# Patient Record
Sex: MALE | Race: WHITE
[De-identification: names, ages, dates, MRNs, and addresses within clinical notes are randomized per-mention and may not be internally consistent; named-entity substitution may affect disease eponyms.]

---

## 2017-06-12 ENCOUNTER — HOSPITAL ENCOUNTER (INPATIENT)
Dept: HOSPITAL 61 - ER | Age: 47
LOS: 1 days | Discharge: HOME | DRG: 444 | End: 2017-06-13
Attending: INTERNAL MEDICINE | Admitting: INTERNAL MEDICINE
Payer: COMMERCIAL

## 2017-06-12 VITALS — SYSTOLIC BLOOD PRESSURE: 155 MMHG | DIASTOLIC BLOOD PRESSURE: 86 MMHG

## 2017-06-12 VITALS — WEIGHT: 274.56 LBS | BODY MASS INDEX: 40.67 KG/M2 | HEIGHT: 69 IN

## 2017-06-12 VITALS — DIASTOLIC BLOOD PRESSURE: 75 MMHG | SYSTOLIC BLOOD PRESSURE: 108 MMHG

## 2017-06-12 VITALS — DIASTOLIC BLOOD PRESSURE: 81 MMHG | SYSTOLIC BLOOD PRESSURE: 113 MMHG

## 2017-06-12 VITALS — SYSTOLIC BLOOD PRESSURE: 120 MMHG | DIASTOLIC BLOOD PRESSURE: 84 MMHG

## 2017-06-12 VITALS — SYSTOLIC BLOOD PRESSURE: 125 MMHG | DIASTOLIC BLOOD PRESSURE: 78 MMHG

## 2017-06-12 DIAGNOSIS — I51.7: ICD-10-CM

## 2017-06-12 DIAGNOSIS — K76.0: ICD-10-CM

## 2017-06-12 DIAGNOSIS — K80.20: Primary | ICD-10-CM

## 2017-06-12 DIAGNOSIS — K29.70: ICD-10-CM

## 2017-06-12 DIAGNOSIS — N17.0: ICD-10-CM

## 2017-06-12 DIAGNOSIS — E78.5: ICD-10-CM

## 2017-06-12 DIAGNOSIS — N18.9: ICD-10-CM

## 2017-06-12 DIAGNOSIS — Z82.49: ICD-10-CM

## 2017-06-12 DIAGNOSIS — K21.0: ICD-10-CM

## 2017-06-12 DIAGNOSIS — I12.9: ICD-10-CM

## 2017-06-12 LAB
ALBUMIN SERPL-MCNC: 4 G/DL (ref 3.4–5)
ALBUMIN/GLOB SERPL: 0.9 {RATIO} (ref 1–1.7)
ALP SERPL-CCNC: 95 U/L (ref 46–116)
ALT SERPL-CCNC: 38 U/L (ref 16–63)
ANION GAP SERPL CALC-SCNC: 11 MMOL/L (ref 6–14)
AST SERPL-CCNC: 17 U/L (ref 15–37)
BASOPHILS # BLD AUTO: 0 X10^3/UL (ref 0–0.2)
BASOPHILS NFR BLD: 0 % (ref 0–3)
BILIRUB SERPL-MCNC: 0.5 MG/DL (ref 0.2–1)
BUN SERPL-MCNC: 14 MG/DL (ref 8–26)
BUN/CREAT SERPL: 10 (ref 6–20)
CALCIUM SERPL-MCNC: 8.7 MG/DL (ref 8.5–10.1)
CHLORIDE SERPL-SCNC: 104 MMOL/L (ref 98–107)
CHOLEST SERPL-MCNC: 223 MG/DL (ref 0–200)
CHOLEST/HDLC SERPL: 6.8 {RATIO}
CO2 SERPL-SCNC: 25 MMOL/L (ref 21–32)
CREAT SERPL-MCNC: 1.4 MG/DL (ref 0.7–1.3)
EOSINOPHIL NFR BLD: 3 % (ref 0–3)
ERYTHROCYTE [DISTWIDTH] IN BLOOD BY AUTOMATED COUNT: 13 % (ref 11.5–14.5)
GFR SERPLBLD BASED ON 1.73 SQ M-ARVRAT: 54.6 ML/MIN
GLOBULIN SER-MCNC: 4.3 G/DL (ref 2.2–3.8)
GLUCOSE SERPL-MCNC: 154 MG/DL (ref 70–99)
HCT VFR BLD CALC: 47.9 % (ref 39–53)
HDLC SERPL-MCNC: 33 MG/DL (ref 40–60)
HGB BLD-MCNC: 16 G/DL (ref 13–17.5)
LYMPHOCYTES # BLD: 3.5 X10^3/UL (ref 1–4.8)
LYMPHOCYTES NFR BLD AUTO: 31 % (ref 24–48)
MCH RBC QN AUTO: 30 PG (ref 25–35)
MCHC RBC AUTO-ENTMCNC: 34 G/DL (ref 31–37)
MCV RBC AUTO: 89 FL (ref 79–100)
MONOCYTES NFR BLD: 10 % (ref 0–9)
NEUTROPHILS NFR BLD AUTO: 57 % (ref 31–73)
NONHDLC SERPL-MCNC: 190 MG/DL (ref 0–129)
PLATELET # BLD AUTO: 260 X10^3/UL (ref 140–400)
POTASSIUM SERPL-SCNC: 3.5 MMOL/L (ref 3.5–5.1)
PROT SERPL-MCNC: 8.3 G/DL (ref 6.4–8.2)
RBC # BLD AUTO: 5.36 X10^6/UL (ref 4.3–5.7)
SODIUM SERPL-SCNC: 140 MMOL/L (ref 136–145)
TRIGL SERPL-MCNC: 117 MG/DL (ref 0–150)
WBC # BLD AUTO: 11.6 X10^3/UL (ref 4–11)

## 2017-06-12 PROCEDURE — 96375 TX/PRO/DX INJ NEW DRUG ADDON: CPT

## 2017-06-12 PROCEDURE — 76700 US EXAM ABDOM COMPLETE: CPT

## 2017-06-12 PROCEDURE — 71275 CT ANGIOGRAPHY CHEST: CPT

## 2017-06-12 PROCEDURE — 93017 CV STRESS TEST TRACING ONLY: CPT

## 2017-06-12 PROCEDURE — 80061 LIPID PANEL: CPT

## 2017-06-12 PROCEDURE — 85027 COMPLETE CBC AUTOMATED: CPT

## 2017-06-12 PROCEDURE — 80053 COMPREHEN METABOLIC PANEL: CPT

## 2017-06-12 PROCEDURE — 80048 BASIC METABOLIC PNL TOTAL CA: CPT

## 2017-06-12 PROCEDURE — 94250: CPT

## 2017-06-12 PROCEDURE — 36415 COLL VENOUS BLD VENIPUNCTURE: CPT

## 2017-06-12 PROCEDURE — 84484 ASSAY OF TROPONIN QUANT: CPT

## 2017-06-12 PROCEDURE — A9500 TC99M SESTAMIBI: HCPCS

## 2017-06-12 PROCEDURE — 78452 HT MUSCLE IMAGE SPECT MULT: CPT

## 2017-06-12 PROCEDURE — 71010: CPT

## 2017-06-12 PROCEDURE — 96374 THER/PROPH/DIAG INJ IV PUSH: CPT

## 2017-06-12 PROCEDURE — 93005 ELECTROCARDIOGRAM TRACING: CPT

## 2017-06-12 PROCEDURE — 94760 N-INVAS EAR/PLS OXIMETRY 1: CPT

## 2017-06-12 PROCEDURE — 85379 FIBRIN DEGRADATION QUANT: CPT

## 2017-06-12 RX ADMIN — PANTOPRAZOLE SODIUM SCH MG: 40 TABLET, DELAYED RELEASE ORAL at 15:12

## 2017-06-12 RX ADMIN — BACITRACIN SCH MLS/HR: 5000 INJECTION, POWDER, FOR SOLUTION INTRAMUSCULAR at 20:29

## 2017-06-12 RX ADMIN — BACITRACIN SCH MLS/HR: 5000 INJECTION, POWDER, FOR SOLUTION INTRAMUSCULAR at 04:41

## 2017-06-12 RX ADMIN — BACITRACIN SCH MLS/HR: 5000 INJECTION, POWDER, FOR SOLUTION INTRAMUSCULAR at 06:32

## 2017-06-12 RX ADMIN — NITROGLYCERIN PRN MG: 0.4 TABLET SUBLINGUAL at 05:38

## 2017-06-12 RX ADMIN — NITROGLYCERIN PRN MG: 0.4 TABLET SUBLINGUAL at 06:24

## 2017-06-12 NOTE — PDOC2
GI CONSULT


Reason For Consult:


Severe gastritis/GERD





HPI:


HPI:


47 y/o male admitted w/ stabbing chest pain that began after an episode of 

reflux during the night.  Associated w/ sweating, radiation to back between 

shoulder blades.  GI cocktail ineffective, relieved w/ nitro.  Labs unrevealing 

except for WBC 11.6, Cr 1.4, dyslipidemia.  Evaluated by cardiology, MPI 

negative.  H/o nocturnal reflux, severe, occurs about once monthly.  He wakes 

up "choking."  Not previously associated w/ chest pain.  No use of antacids, H2 

blockers, or PPI.  No previous EGD.  Does take Excedrin PRN.  No dysphagia, n/v

, abd pain, diarrhea, constipation, hematochezia, melena.  No previous 

colonoscopy.  Pain better, now an "ache."





PMH:


PMH:


GERD, HTN, HLD, benign neck tumor removal





FH:


Family History:  No pertinent hx (denies GI cancers)





Social History:


Smoke:  No


ALCOHOL:  none


Drugs:  None





ROS:





GEN: +sweats


HEENT: Denies blurred vision, sore throat


CV: +chest pain


RESP: Denies shortness of air, cough


GI: Per HPI


: Denies hematuria, dysuria


ENDO: Denies weight changes


NEURO: Denies confusion, dizziness


MSK: Denies weakness, joint pain/swelling


SKIN: Denies jaundice, pruritus





Vitals:


Vitals:





 Vital Signs








  Date Time  Temp Pulse Resp B/P (MAP) Pulse Ox O2 Delivery O2 Flow Rate FiO2


 


6/12/17 15:00 98.4 66 18 120/84 (96) 98 Room Air  





 98.4       











Labs:


Labs:





Laboratory Tests








Test


  6/12/17


03:25 6/12/17


10:05


 


White Blood Count


  11.6 x10^3/uL


(4.0-11.0) 


 


 


Red Blood Count


  5.36 x10^6/uL


(4.30-5.70) 


 


 


Hemoglobin


  16.0 g/dL


(13.0-17.5) 


 


 


Hematocrit


  47.9 %


(39.0-53.0) 


 


 


Mean Corpuscular Volume 89 fL ()  


 


Mean Corpuscular Hemoglobin 30 pg (25-35)  


 


Mean Corpuscular Hemoglobin


Concent 34 g/dL


(31-37) 


 


 


Red Cell Distribution Width


  13.0 %


(11.5-14.5) 


 


 


Platelet Count


  260 x10^3/uL


(140-400) 


 


 


Neutrophils (%) (Auto) 57 % (31-73)  


 


Lymphocytes (%) (Auto) 31 % (24-48)  


 


Monocytes (%) (Auto) 10 % (0-9)  


 


Eosinophils (%) (Auto) 3 % (0-3)  


 


Basophils (%) (Auto) 0 % (0-3)  


 


Neutrophils # (Auto)


  6.6 x10^3uL


(1.8-7.7) 


 


 


Lymphocytes # (Auto)


  3.5 x10^3/uL


(1.0-4.8) 


 


 


Monocytes # (Auto)


  1.1 x10^3/uL


(0.0-1.1) 


 


 


Eosinophils # (Auto)


  0.3 x10^3/uL


(0.0-0.7) 


 


 


Basophils # (Auto)


  0.0 x10^3/uL


(0.0-0.2) 


 


 


D-Dimer (Concepcion)


  < 0.27


ug/mlFEU 


 


 


Sodium Level


  140 mmol/L


(136-145) 


 


 


Potassium Level


  3.5 mmol/L


(3.5-5.1) 


 


 


Chloride Level


  104 mmol/L


() 


 


 


Carbon Dioxide Level


  25 mmol/L


(21-32) 


 


 


Anion Gap 11 (6-14)  


 


Blood Urea Nitrogen


  14 mg/dL


(8-26) 


 


 


Creatinine


  1.4 mg/dL


(0.7-1.3) 


 


 


Estimated GFR


(Cockcroft-Gault) 54.6 


  


 


 


BUN/Creatinine Ratio 10 (6-20)  


 


Glucose Level


  154 mg/dL


(70-99) 


 


 


Calcium Level


  8.7 mg/dL


(8.5-10.1) 


 


 


Total Bilirubin


  0.5 mg/dL


(0.2-1.0) 


 


 


Aspartate Amino Transf


(AST/SGOT) 17 U/L (15-37) 


  


 


 


Alanine Aminotransferase


(ALT/SGPT) 38 U/L (16-63) 


  


 


 


Alkaline Phosphatase


  95 U/L


() 


 


 


Troponin I Quantitative


  < 0.017 ng/mL


(0.000-0.055) < 0.017 ng/mL


(0.000-0.055)


 


Total Protein


  8.3 g/dL


(6.4-8.2) 


 


 


Albumin


  4.0 g/dL


(3.4-5.0) 


 


 


Albumin/Globulin Ratio 0.9 (1.0-1.7)  


 


Triglycerides Level


  117 mg/dL


(0-150) 


 


 


Cholesterol Level


  223 mg/dL


(0-200) 


 


 


LDL Cholesterol, Calculated


  167 mg/dL


(0-100) 


 


 


VLDL Cholesterol, Calculated


  23 mg/dL


(0-40) 


 


 


Non-HDL Cholesterol Calculated


  190 mg/dL


(0-129) 


 


 


HDL Cholesterol


  33 mg/dL


(40-60) 


 


 


Cholesterol/HDL Ratio 6.8  











Allergies:


Coded Allergies:  


     No Known Drug Allergies (Unverified , 6/12/17)





Medications:





Current Medications








 Medications


  (Trade)  Dose


 Ordered  Sig/Jorge


 Route


 PRN Reason  Start Time


 Stop Time Status Last Admin


Dose Admin


 


 Multi-Ingredient


 Mouthwash/Gargle


  (Gi Cocktail


 Single Dose)  15 ml  1X  ONCE


 SWSW


   6/12/17 04:30


 6/12/17 04:31 DC 6/12/17 04:40


 


 


 Morphine Sulfate  4 mg  1X  ONCE


 IV


   6/12/17 04:30


 6/12/17 04:31 DC 6/12/17 04:40


 


 


 Ondansetron HCl


  (Zofran)  4 mg  1X  ONCE


 IV


   6/12/17 04:30


 6/12/17 04:31 DC 6/12/17 04:40


 


 


 Sodium Chloride  1,000 ml @ 


 125 mls/hr  Q8H


 IV


   6/12/17 04:29


 6/13/17 04:28  6/12/17 06:32


 


 


 Nitroglycerin


  (Nitrostat)  0.4 mg  PRN Q5MIN  PRN


 SL


 CHEST PAIN  6/12/17 04:30


 6/13/17 04:29  6/12/17 06:24


 


 


 Iohexol


  (Omnipaque 300


 Mg/ml)  60 ml  1X  ONCE


 IV


   6/12/17 05:30


 6/12/17 05:31 DC 6/12/17 05:14


 


 


 Aspirin


  (Shital Aspirin)  325 mg  1X  ONCE


 PO


   6/12/17 09:45


 6/12/17 09:46 DC 6/12/17 10:08


 


 


 Regadenoson


  (Lexiscan)  0.4 mg  1X  ONCE


 IV


   6/12/17 11:45


 6/12/17 11:46 DC 6/12/17 12:15


 


 


 Pantoprazole


 Sodium


  (Protonix)  40 mg  DAILYAC


 PO


   6/12/17 15:00


    6/12/17 15:12


 











Imaging:


Imaging:


CXR 6/12/17 x2


IMPRESSION: 


1. No confluent infiltrates.





Chest CTA 6/12/17


IMPRESSION:


1. No evidence of pulmonary embolism.


2. The lungs are clear.


3. Mild cardiomegaly.








MPI 6/12/17


Conclusion


1. Regadenoson cardioisotope stress test did not show any evidence of ischemia 

or infarct.


2. Normal left ventricular systolic function with ejection fraction calculated 

at 75%.


3. Low risk for cardiac events.





PE:





GEN: NAD, laying in bed


HEENT: Atraumatic, PERRL


LUNGS: CTAB


HEART: RRR


ABD: NABS, S/ND/NT, overweight


EXTREMITY: No edema


SKIN: No rashes, no jaundice


NEURO/PSYCH: A & O 3


OTHER: wife present





A/P:


A/P:


Chest pain w/ radiation to upper back


-onset during the night w/ reflux





Reflux


-history of this, nocturnal symptoms, wakes up "choking" once monthly


-untreated, no previous eval


-started on PPI today





NSAID use


-Excedrin





CRC screen


-no previous colonoscopy, average risk





--


Will review w/ Dr. Montiel - ?need for inpatient EGD.


Agree w/ PPI.


Screening colonoscopy age 50.











PRAKASH OCHOA Jun 12, 2017 15:46

## 2017-06-12 NOTE — PHYS DOC
Past Medical History


Past Medical History:  No Pertinent History


Past Surgical History:  Other


Additional Past Surgical Histo:  RIGHT NECK TUMOR REMOVED


Alcohol Use:  Occasionally


Drug Use:  None





Adult General


Chief Complaint


Chief Complaint:  CHEST PAIN





HPI


HPI





Patient is a 46  year old [f__sex] who presents with []





Review of Systems


Review of Systems





Constitutional: Denies fever or chills []


Eyes: Denies change in visual acuity, redness, or eye pain []


HENT: Denies nasal congestion or sore throat []


Respiratory: Denies cough or shortness of breath []


Cardiovascular: No additional information not addressed in HPI []


GI: Denies abdominal pain, nausea, vomiting, bloody stools or diarrhea []


: Denies dysuria or hematuria []


Musculoskeletal: Denies back pain or joint pain []


Integument: Denies rash or skin lesions []


Neurologic: Denies headache, focal weakness or sensory changes []


Endocrine: Denies polyuria or polydipsia []





Allergies


Allergies





Allergies








Coded Allergies Type Severity Reaction Last Updated Verified


 


  No Known Drug Allergies    6/12/17 No











Physical Exam


Physical Exam





Constitutional: Well developed, well nourished, no acute distress, non-toxic 

appearance. []


HENT: Normocephalic, atraumatic, bilateral external ears normal, oropharynx 

moist, no oral exudates, nose normal. []


Eyes: PERRLA, EOMI, conjunctiva normal, no discharge. [] 


Neck: Normal range of motion, no tenderness, supple, no stridor. [] 


Cardiovascular:Heart rate regular rhythm, no murmur []


Lungs & Thorax:  Bilateral breath sounds clear to auscultation []


Abdomen: Bowel sounds normal, soft, no tenderness, no masses, no pulsatile 

masses. [] 


Skin: Warm, dry, no erythema, no rash. [] 


Back: No tenderness, no CVA tenderness. [] 


Extremities: No tenderness, no cyanosis, no clubbing, ROM intact, no edema. [] 


Neurologic: Alert and oriented X 3, normal motor function, normal sensory 

function, no focal deficits noted. []


Psychologic: Affect normal, judgement normal, mood normal. []





Current Patient Data


Vital Signs





 Vital Signs








  Date Time  Temp Pulse Resp B/P (MAP) Pulse Ox O2 Delivery O2 Flow Rate FiO2


 


6/12/17 03:20 97.6 96 20 159/86 (110) 100 Room Air  





 97.6       








Lab Values





 Laboratory Tests








Test


  6/12/17


03:25


 


White Blood Count


  11.6 x10^3/uL


(4.0-11.0)  H


 


Red Blood Count


  5.36 x10^6/uL


(4.30-5.70)


 


Hemoglobin


  16.0 g/dL


(13.0-17.5)


 


Hematocrit


  47.9 %


(39.0-53.0)


 


Mean Corpuscular Volume


  89 fL ()


 


 


Mean Corpuscular Hemoglobin 30 pg (25-35)  


 


Mean Corpuscular Hemoglobin


Concent 34 g/dL


(31-37)


 


Red Cell Distribution Width


  13.0 %


(11.5-14.5)


 


Platelet Count


  260 x10^3/uL


(140-400)


 


Neutrophils (%) (Auto) 57 % (31-73)  


 


Lymphocytes (%) (Auto) 31 % (24-48)  


 


Monocytes (%) (Auto) 10 % (0-9)  H


 


Eosinophils (%) (Auto) 3 % (0-3)  


 


Basophils (%) (Auto) 0 % (0-3)  


 


Neutrophils # (Auto)


  6.6 x10^3uL


(1.8-7.7)


 


Lymphocytes # (Auto)


  3.5 x10^3/uL


(1.0-4.8)


 


Monocytes # (Auto)


  1.1 x10^3/uL


(0.0-1.1)


 


Eosinophils # (Auto)


  0.3 x10^3/uL


(0.0-0.7)


 


Basophils # (Auto)


  0.0 x10^3/uL


(0.0-0.2)


 


D-Dimer (Concepcion)


  < 0.27


ug/mlFEU


 


Sodium Level


  140 mmol/L


(136-145)


 


Potassium Level


  3.5 mmol/L


(3.5-5.1)


 


Chloride Level


  104 mmol/L


()


 


Carbon Dioxide Level


  25 mmol/L


(21-32)


 


Anion Gap 11 (6-14)  


 


Blood Urea Nitrogen


  14 mg/dL


(8-26)


 


Creatinine


  1.4 mg/dL


(0.7-1.3)  H


 


Estimated GFR


(Cockcroft-Gault) 54.6  


 


 


BUN/Creatinine Ratio 10 (6-20)  


 


Glucose Level


  154 mg/dL


(70-99)  H


 


Calcium Level


  8.7 mg/dL


(8.5-10.1)


 


Total Bilirubin


  0.5 mg/dL


(0.2-1.0)


 


Aspartate Amino Transferase


(AST) 17 U/L (15-37)


 


 


Alanine Aminotransferase (ALT)


  38 U/L (16-63)


 


 


Alkaline Phosphatase


  95 U/L


()


 


Troponin I Quantitative


  < 0.017 ng/mL


(0.000-0.055)


 


Total Protein


  8.3 g/dL


(6.4-8.2)  H


 


Albumin


  4.0 g/dL


(3.4-5.0)


 


Albumin/Globulin Ratio


  0.9 (1.0-1.7)


L





 Laboratory Tests


6/12/17 03:25








 Laboratory Tests


6/12/17 03:25














EKG


EKG


[]





Radiology/Procedures


Radiology/Procedures


[]





Course & Med Decision Making


Course & Med Decision Making


Pertinent Labs and Imaging studies reviewed. (See chart for details)





[]





Dragon Disclaimer


Dragon Disclaimer


This electronic medical record was generated, in whole or in part, using a 

voice recognition dictation system.





Departure


Departure


Impression:  


 Primary Impression:  


 Chest pain


Disposition:  09 ADMITTED AS INPATIENT


Admitting Physician:  Other (reusch)


Condition:  STABLE


Referrals:  


NO PCP (PCP)





Problem Qualifiers








 Primary Impression:  


 Chest pain


 Chest pain type:  unspecified  Qualified Codes:  R07.9 - Chest pain, 

unspecified








MIGUEL BOSWELL MD Jun 12, 2017 05:39

## 2017-06-12 NOTE — RAD
--------------- APPROVED REPORT --------------





Test Type:          Pharmacological

Stress Nurse/Tech: Isha Arce R.N.

Test Indications: Chest pain, back pain.

Cardiac History: Family hx of CAD.

Medications:     SEE EMR

Medical History: SEE EMR

Resting ECG:     SR

Resting Heart Rate: 67 bpm

Resting Blood Pressure: 144/80mmHg

Pretest Chest Pain: None



Nurse/Tech Notes

S1S2, lungs CTA, denied chest pain and SOA.

Consent: The procedure was explained to the patient in lay terms. Informed consent was witnessed. Servando
eout was entered into [employer]. History and Stress Test performed by Isha Arce R.N.



Pharm. Details

Pharmacologic stress testing was performed using 0.4mg per 5ml of regadenoson given intravenously ove
r 7-10 seconds.



Stress Symptoms

Slightly SOA.



POST EXERCISE

Reason for Termination: Infusion complete

Max HR: 103 bpm

Max Blood Pressure: 160/88mmHg

Blood Pressure response to exercise: Normal blood pressure response during stress.

Chest Pain: No. 

Arrhythmia: No. 

ST Change: No. 



INTERPRETATION

Stress EKG Conclusion: Baseline EKG showed sinus rhythm.  No ischemic changes at peak stress.  No arr
hythmias.



Imaging Protocol

IMAGE PROTOCOL: Stress Tc-99m/rest Tc-99m 2 days



Rest:            Stress:         Viability:   

Radiopharm.Tc99m Sestamibi

[id number]            

Img Date  06/12/2017      

Inj-Img [id number].           



Stress Admin Site: IV - Right AntecubitalAdministrator: ELIGIO Germain, ARRT (R)(N)



STRESS DATA

End Diast. Vol.116.0mlAv. Heart Rate69.0bpm

End Syst. Vol.29.0mlCO Index BSA0.0L/min

Myocardial Uxsy439.0gEject. Htvhgkoc05.0%



Stress Rates

Pk. Fill Rate2.81EDV/secLVtime Pk. Fill 163.15msec

Pk. Empty Rate3.33ESV/secLVtime Pk. Atujh332.77msec

1/3 Pk. Fill1.84EDV/sec



Stress Scores

Regional WT0.00Summed WT0.00

Regional WM0.00Summed WM0.00



LV Perfusion

Stress scintigraphic images did not show any significant perfusion defects.



Wall Motion

Normal left ventricular systolic function with ejection fraction calculated at 75%.



LV Perf. Quant

17 Seg. SSS0.00

Stress Defect Extent (% LAD)0.00Rest Defect Extent (% LAD)Rev. Defect Extent (% LAD)0.00

Stress Defect Extent (% LCX) 0.00Rest Defect Extent (% LCX)Rev. Defect Extent (% LCX)0.00

Stress Defect Extent (% RCA)0.00Rest Defect Extent (% RCA)Rev. Defect Extent (% RCA)0.00

Stress Defect Extent (% MAKENZIE)0.00Rest Defect Extent (% MAKENZIE)Rev. Defect Extent (% MAKENZIE)0.00



Conclusion

1. Regadenoson cardioisotope stress test did not show any evidence of ischemia or infarct.

2. Normal left ventricular systolic function with ejection fraction calculated at 75%.

3. Low risk for cardiac events.

## 2017-06-12 NOTE — PDOC2
JOSE LUIS BO APRN 6/12/17 0932:


CARDIAC CONSULT


DATE OF CONSULT


Date of Consult


DATE: 6/12/17 


TIME: 09:29





REASON FOR CONSULT


Reason for Consult:


Chest Pain





REFERRING PHYSICIAN


Referring Physician:


Dr. Green





SOURCE


Source:  Chart review, Patient





HISTORY OF PRESENT ILLNESS


HISTORY OF PRESENT ILLNESS


This is a 45 yo male who presented with complaints of chest pain. Patient 

reports pain woke him up from sleep this morning around 1am. Patient reports 

having a "really bad episode of GERD."  Pain located in his central chest. 

Describes as heaviness, making it hard to breath as it is difficult to take a 

deep breath. Improved with taking a deep breath and by pressing firmly on 

chest. Radiates through to his back. Associated with diaphoresis. No 

palpitations, dizziness, or nausea/vomiting. GI cocktail with no change in 

symptoms. Resolved with 2 nitro. No previous experience with similar-type pain. 

Does have a history of hypertension and hyperlipidemia of which he does not 

take medications for and has not recently had checked. Family history of heart 

disease; brother with MI at age 45 and father in his early 60's. Additional 

history of GERD for which the patient provides that this pain is kind of 

similar but he has never had his back hurt as well. Initial EKG with limb lead 

reversal.





PAST MEDICAL HISTORY


Cardiovascular:  HTN, Hyperlipidemia


Pulmonary:  No pertinent hx


GI:  GERD


Heme/Onc:  No pertinent hx


Hepatobiliary:  No pertinent hx


Psych:  No pertinent hx


Rheumatologic:  No pertinent hx


Infectious disease:  No pertinent hx


ENT:  No pertinent hx


Renal/:  No pertinent hx


Endocrine:  No pertinent hx


Dermatology:  No pertinent hx





PAST SURGICAL HISTORY


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





FAMILY HISTORY


Family History:  Coronary Artery Disease, Hypertension





SOCIAL HISTORY


Smoke:  No


ALCOHOL:  none


Drugs:  None


Lives:  with Family





CURRENT MEDICATIONS


CURRENT MEDICATIONS





Current Medications








 Medications


  (Trade)  Dose


 Ordered  Sig/Jorge


 Route


 PRN Reason  Start Time


 Stop Time Status Last Admin


Dose Admin


 


 Multi-Ingredient


 Mouthwash/Gargle


  (Gi Cocktail


 Single Dose)  15 ml  1X  ONCE


 SWSW


   6/12/17 04:30


 6/12/17 04:31 DC 6/12/17 04:40


 


 


 Morphine Sulfate  4 mg  1X  ONCE


 IV


   6/12/17 04:30


 6/12/17 04:31 DC 6/12/17 04:40


 


 


 Ondansetron HCl


  (Zofran)  4 mg  1X  ONCE


 IV


   6/12/17 04:30


 6/12/17 04:31 DC 6/12/17 04:40


 


 


 Sodium Chloride  1,000 ml @ 


 125 mls/hr  Q8H


 IV


   6/12/17 04:29


 6/13/17 04:28  6/12/17 06:32


 


 


 Nitroglycerin


  (Nitrostat)  0.4 mg  PRN Q5MIN  PRN


 SL


 CHEST PAIN  6/12/17 04:30


 6/13/17 04:29  6/12/17 06:24


 


 


 Iohexol


  (Omnipaque 300


 Mg/ml)  60 ml  1X  ONCE


 IV


   6/12/17 05:30


 6/12/17 05:31 DC 6/12/17 05:14


 











ALLERGIES


ALLERGIES:  


Coded Allergies:  


     No Known Drug Allergies (Unverified , 6/12/17)





ROS


Review of System


14 point ROS conducted with pertinent positives noted above in HPI.





PHYSICAL EXAM


General:  Alert, Oriented X3, Cooperative, No acute distress


HEENT:  Atraumatic, Mucous membr. moist/pink


Lungs:  Clear to auscultation, Normal air movement


Heart:  Regular rate, Normal S1, Normal S2, No murmurs


Abdomen:  Soft, No tenderness


Extremities:  No cyanosis, No edema, Normal pulses


Skin:  No significant lesion


Neuro:  Normal speech, Sensation intact


Psych/Mental Status:  Mental status NL, Mood NL


MUSCULOSKELETAL:  No joint tenderness





VITALS


VITALS





Vital Signs








  Date Time  Temp Pulse Resp B/P (MAP) Pulse Ox O2 Delivery O2 Flow Rate FiO2


 


6/12/17 06:24  80  155/86    


 


6/12/17 06:15 98.0  20  100 Room Air  





 98.0       











LABS


Lab:





Laboratory Tests








Test


  6/12/17


03:25


 


White Blood Count


  11.6 x10^3/uL


(4.0-11.0)


 


Red Blood Count


  5.36 x10^6/uL


(4.30-5.70)


 


Hemoglobin


  16.0 g/dL


(13.0-17.5)


 


Hematocrit


  47.9 %


(39.0-53.0)


 


Mean Corpuscular Volume 89 fL () 


 


Mean Corpuscular Hemoglobin 30 pg (25-35) 


 


Mean Corpuscular Hemoglobin


Concent 34 g/dL


(31-37)


 


Red Cell Distribution Width


  13.0 %


(11.5-14.5)


 


Platelet Count


  260 x10^3/uL


(140-400)


 


Neutrophils (%) (Auto) 57 % (31-73) 


 


Lymphocytes (%) (Auto) 31 % (24-48) 


 


Monocytes (%) (Auto) 10 % (0-9) 


 


Eosinophils (%) (Auto) 3 % (0-3) 


 


Basophils (%) (Auto) 0 % (0-3) 


 


Neutrophils # (Auto)


  6.6 x10^3uL


(1.8-7.7)


 


Lymphocytes # (Auto)


  3.5 x10^3/uL


(1.0-4.8)


 


Monocytes # (Auto)


  1.1 x10^3/uL


(0.0-1.1)


 


Eosinophils # (Auto)


  0.3 x10^3/uL


(0.0-0.7)


 


Basophils # (Auto)


  0.0 x10^3/uL


(0.0-0.2)


 


D-Dimer (Concepcion)


  < 0.27


ug/mlFEU


 


Sodium Level


  140 mmol/L


(136-145)


 


Potassium Level


  3.5 mmol/L


(3.5-5.1)


 


Chloride Level


  104 mmol/L


()


 


Carbon Dioxide Level


  25 mmol/L


(21-32)


 


Anion Gap 11 (6-14) 


 


Blood Urea Nitrogen


  14 mg/dL


(8-26)


 


Creatinine


  1.4 mg/dL


(0.7-1.3)


 


Estimated GFR


(Cockcroft-Gault) 54.6 


 


 


BUN/Creatinine Ratio 10 (6-20) 


 


Glucose Level


  154 mg/dL


(70-99)


 


Calcium Level


  8.7 mg/dL


(8.5-10.1)


 


Total Bilirubin


  0.5 mg/dL


(0.2-1.0)


 


Aspartate Amino Transf


(AST/SGOT) 17 U/L (15-37) 


 


 


Alanine Aminotransferase


(ALT/SGPT) 38 U/L (16-63) 


 


 


Alkaline Phosphatase


  95 U/L


()


 


Troponin I Quantitative


  < 0.017 ng/mL


(0.000-0.055)


 


Total Protein


  8.3 g/dL


(6.4-8.2)


 


Albumin


  4.0 g/dL


(3.4-5.0)


 


Albumin/Globulin Ratio 0.9 (1.0-1.7) 











ASSESSMENT/PLAN


ASSESSMENT/PLAN


1.  Chest pain


   ASA now. Repeat EKG


   initial trop negative; continue to trend 


   pain possibly GI in origin, but given history/risk factors, will proceed 

with MPI to r/o ischemia


   


2.  Hypertension


   add ACE





3.  Hyperlipidemia


   check lipids; statin if indicated 





4.  ? CKD


   Cr 1.4 





5.  GERD


   recommend daily PPI


Problems:  





KATIE SHANKAR MD 6/12/17 1549:


CARDIAC CONSULT


ALLERGIES


ALLERGIES:  


Coded Allergies:  


     No Known Drug Allergies (Unverified , 6/12/17)





ASSESSMENT/PLAN


ASSESSMENT/PLAN


Patient seen and examined. Agree with NP's assessment and plan.


Chest pain with atypical features and most probably GI etiology.


Myocardial infarction be ruled out.


Lexiscan nuclear stress test did not show any significant ischemia.


Consider proton pump inhibitors.


Thank you for your consultation.


Problems:  











JOSE LUIS BO Jun 12, 2017 09:32


KATIE SHANKAR MD Jun 12, 2017 15:49

## 2017-06-12 NOTE — PDOC1
History and Physical


Past Medical History


Cardiovascular:  HTN, Hyperlipidemia


Pulmonary:  No pertinent hx


GI:  GERD


Heme/Onc:  No pertinent hx


Hepatobiliary:  No pertinent hx


Psych:  No pertinent hx


Rheumatologic:  No pertinent hx


Infectious disease:  No pertinent hx


ENT:  No pertinent hx


Renal/:  No pertinent hx


Endocrine:  No pertinent hx


Dermatology:  No pertinent hx





Past Surgical History


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





Family History


Family History:  Coronary Artery Disease, Hypertension





Social History


Smoke:  No


ALCOHOL:  none


Drugs:  None





Current Problem List


Problem List


Problems


Medical Problems:


(1) Chest pain


Status: Acute  











Current Medications


Current Medications





Current Medications








 Medications


  (Trade)  Dose


 Ordered  Sig/Jorge  Start Time


 Stop Time Status Last Admin


Dose Admin


 


 Acetaminophen


  (Tylenol)  650 mg  PRN Q4HRS  PRN  6/12/17 04:30


 6/13/17 04:29   


 


 


 Aspirin


  (Shital Aspirin)  325 mg  1X  ONCE  6/12/17 09:45


 6/12/17 09:46 DC 6/12/17 10:08


325 MG


 


 Info


  (Do NOT chart on


 this entry -- for


 MONITORING)  1 each  PRN DAILY  PRN  6/12/17 05:15


 6/14/17 05:14   


 


 


 Iohexol


  (Omnipaque 300


 Mg/ml)  75 ml  STK-MED ONCE  6/12/17 05:02


 6/12/17 05:03 DC  


 


 


 Morphine Sulfate  4 mg  PRN Q2HR  PRN  6/12/17 04:30


 6/13/17 04:29   


 


 


 Multi-Ingredient


 Mouthwash/Gargle


  (Gi Cocktail


 Single Dose)  15 ml  1X  ONCE  6/12/17 04:30


 6/12/17 04:31 DC 6/12/17 04:40


15 ML


 


 Nitroglycerin


  (Nitrostat)  0.4 mg  PRN Q5MIN  PRN  6/12/17 04:30


 6/13/17 04:29  6/12/17 06:24


0.4 MG


 


 Ondansetron HCl


  (Zofran)  4 mg  PRN Q8HRS  PRN  6/12/17 04:30


 6/13/17 04:29   


 


 


 Sodium Chloride  1,000 ml @ 


 125 mls/hr  Q8H  6/12/17 04:29


 6/13/17 04:28  6/12/17 06:32


125 MLS/HR











Allergies


Allergies





Allergies








Coded Allergies Type Severity Reaction Last Updated Verified


 


  No Known Drug Allergies    6/12/17 No











ROS


Review of System


CONSTITUTIONAL:        No fever or chills


EYES:                          No recent changes


SKIN:               No rash or itching


CARDIOVASCULAR:     No chest pain, syncope, palpitations, or edema


RESPIRATORY:            No SOB or cough


GASTROINTESTINAL:    No nausea, vomiting or abdominal pain


NEUROLOGICAL:          No headaches or weakness


ENDOCRINE:               No cold or heat intolerance


GENITOURINARY:         No urgency or frequency of urination


MUSCULOSKELETAL:   No back pain or joint pain


LYMPHATICS:               No enlarged lymph nodes


PSYCHIATRIC:              No anxiety or depression





Physical Exam


Physical Exam


GEN.:    No apparent distress.  Alert and oriented.


HEENT:    Head is normocephalic, atraumatic


NECK:    Supple.  


LUNGS:    Clear to auscultation.


HEART:    RRR, S1, S2 present.  Peripheral pulses intact


ABDOMEN:    Soft, nontender.  Positive bowel sounds.


EXTREMITIES:    Without any cyanosis.    


NEUROLOGIC:     Normal speech, normal tone


PSYCHIATRIC:    Normal affect, normal mood.


SKIN:   No ulcerations





Vitals


Vitals





Vital Signs








  Date Time  Temp Pulse Resp B/P (MAP) Pulse Ox O2 Delivery O2 Flow Rate FiO2


 


6/12/17 11:00 98.2 71 17 125/78 (94) 100 Room Air  





 98.2       











Labs


Labs





Laboratory Tests








Test


  6/12/17


03:25 6/12/17


10:05


 


White Blood Count


  11.6 x10^3/uL


(4.0-11.0) 


 


 


Red Blood Count


  5.36 x10^6/uL


(4.30-5.70) 


 


 


Hemoglobin


  16.0 g/dL


(13.0-17.5) 


 


 


Hematocrit


  47.9 %


(39.0-53.0) 


 


 


Mean Corpuscular Volume 89 fL ()  


 


Mean Corpuscular Hemoglobin 30 pg (25-35)  


 


Mean Corpuscular Hemoglobin


Concent 34 g/dL


(31-37) 


 


 


Red Cell Distribution Width


  13.0 %


(11.5-14.5) 


 


 


Platelet Count


  260 x10^3/uL


(140-400) 


 


 


Neutrophils (%) (Auto) 57 % (31-73)  


 


Lymphocytes (%) (Auto) 31 % (24-48)  


 


Monocytes (%) (Auto) 10 % (0-9)  


 


Eosinophils (%) (Auto) 3 % (0-3)  


 


Basophils (%) (Auto) 0 % (0-3)  


 


Neutrophils # (Auto)


  6.6 x10^3uL


(1.8-7.7) 


 


 


Lymphocytes # (Auto)


  3.5 x10^3/uL


(1.0-4.8) 


 


 


Monocytes # (Auto)


  1.1 x10^3/uL


(0.0-1.1) 


 


 


Eosinophils # (Auto)


  0.3 x10^3/uL


(0.0-0.7) 


 


 


Basophils # (Auto)


  0.0 x10^3/uL


(0.0-0.2) 


 


 


D-Dimer (Concepcion)


  < 0.27


ug/mlFEU 


 


 


Sodium Level


  140 mmol/L


(136-145) 


 


 


Potassium Level


  3.5 mmol/L


(3.5-5.1) 


 


 


Chloride Level


  104 mmol/L


() 


 


 


Carbon Dioxide Level


  25 mmol/L


(21-32) 


 


 


Anion Gap 11 (6-14)  


 


Blood Urea Nitrogen


  14 mg/dL


(8-26) 


 


 


Creatinine


  1.4 mg/dL


(0.7-1.3) 


 


 


Estimated GFR


(Cockcroft-Gault) 54.6 


  


 


 


BUN/Creatinine Ratio 10 (6-20)  


 


Glucose Level


  154 mg/dL


(70-99) 


 


 


Calcium Level


  8.7 mg/dL


(8.5-10.1) 


 


 


Total Bilirubin


  0.5 mg/dL


(0.2-1.0) 


 


 


Aspartate Amino Transf


(AST/SGOT) 17 U/L (15-37) 


  


 


 


Alanine Aminotransferase


(ALT/SGPT) 38 U/L (16-63) 


  


 


 


Alkaline Phosphatase


  95 U/L


() 


 


 


Troponin I Quantitative


  < 0.017 ng/mL


(0.000-0.055) < 0.017 ng/mL


(0.000-0.055)


 


Total Protein


  8.3 g/dL


(6.4-8.2) 


 


 


Albumin


  4.0 g/dL


(3.4-5.0) 


 


 


Albumin/Globulin Ratio 0.9 (1.0-1.7)  


 


Triglycerides Level


  117 mg/dL


(0-150) 


 


 


Cholesterol Level


  223 mg/dL


(0-200) 


 


 


LDL Cholesterol, Calculated


  167 mg/dL


(0-100) 


 


 


VLDL Cholesterol, Calculated


  23 mg/dL


(0-40) 


 


 


Non-HDL Cholesterol Calculated


  190 mg/dL


(0-129) 


 


 


HDL Cholesterol


  33 mg/dL


(40-60) 


 


 


Cholesterol/HDL Ratio 6.8  








Laboratory Tests








Test


  6/12/17


03:25 6/12/17


10:05


 


White Blood Count


  11.6 x10^3/uL


(4.0-11.0) 


 


 


Red Blood Count


  5.36 x10^6/uL


(4.30-5.70) 


 


 


Hemoglobin


  16.0 g/dL


(13.0-17.5) 


 


 


Hematocrit


  47.9 %


(39.0-53.0) 


 


 


Mean Corpuscular Volume 89 fL ()  


 


Mean Corpuscular Hemoglobin 30 pg (25-35)  


 


Mean Corpuscular Hemoglobin


Concent 34 g/dL


(31-37) 


 


 


Red Cell Distribution Width


  13.0 %


(11.5-14.5) 


 


 


Platelet Count


  260 x10^3/uL


(140-400) 


 


 


Neutrophils (%) (Auto) 57 % (31-73)  


 


Lymphocytes (%) (Auto) 31 % (24-48)  


 


Monocytes (%) (Auto) 10 % (0-9)  


 


Eosinophils (%) (Auto) 3 % (0-3)  


 


Basophils (%) (Auto) 0 % (0-3)  


 


Neutrophils # (Auto)


  6.6 x10^3uL


(1.8-7.7) 


 


 


Lymphocytes # (Auto)


  3.5 x10^3/uL


(1.0-4.8) 


 


 


Monocytes # (Auto)


  1.1 x10^3/uL


(0.0-1.1) 


 


 


Eosinophils # (Auto)


  0.3 x10^3/uL


(0.0-0.7) 


 


 


Basophils # (Auto)


  0.0 x10^3/uL


(0.0-0.2) 


 


 


D-Dimer (Concepcion)


  < 0.27


ug/mlFEU 


 


 


Sodium Level


  140 mmol/L


(136-145) 


 


 


Potassium Level


  3.5 mmol/L


(3.5-5.1) 


 


 


Chloride Level


  104 mmol/L


() 


 


 


Carbon Dioxide Level


  25 mmol/L


(21-32) 


 


 


Anion Gap 11 (6-14)  


 


Blood Urea Nitrogen


  14 mg/dL


(8-26) 


 


 


Creatinine


  1.4 mg/dL


(0.7-1.3) 


 


 


Estimated GFR


(Cockcroft-Gault) 54.6 


  


 


 


BUN/Creatinine Ratio 10 (6-20)  


 


Glucose Level


  154 mg/dL


(70-99) 


 


 


Calcium Level


  8.7 mg/dL


(8.5-10.1) 


 


 


Total Bilirubin


  0.5 mg/dL


(0.2-1.0) 


 


 


Aspartate Amino Transf


(AST/SGOT) 17 U/L (15-37) 


  


 


 


Alanine Aminotransferase


(ALT/SGPT) 38 U/L (16-63) 


  


 


 


Alkaline Phosphatase


  95 U/L


() 


 


 


Troponin I Quantitative


  < 0.017 ng/mL


(0.000-0.055) < 0.017 ng/mL


(0.000-0.055)


 


Total Protein


  8.3 g/dL


(6.4-8.2) 


 


 


Albumin


  4.0 g/dL


(3.4-5.0) 


 


 


Albumin/Globulin Ratio 0.9 (1.0-1.7)  


 


Triglycerides Level


  117 mg/dL


(0-150) 


 


 


Cholesterol Level


  223 mg/dL


(0-200) 


 


 


LDL Cholesterol, Calculated


  167 mg/dL


(0-100) 


 


 


VLDL Cholesterol, Calculated


  23 mg/dL


(0-40) 


 


 


Non-HDL Cholesterol Calculated


  190 mg/dL


(0-129) 


 


 


HDL Cholesterol


  33 mg/dL


(40-60) 


 


 


Cholesterol/HDL Ratio 6.8  











VTE Prophylaxis Ordered


VTE Prophylaxis Devices:  No


VTE Pharmacological Prophylaxi:  No











CARLY SERRA MD Jun 12, 2017 11:29

## 2017-06-12 NOTE — RAD
Examination: CT angiography chest

 

HISTORY: History of chest pain, shortness of breath

 

COMPARISON: None available

 

TECHNIQUE: Axial CT and radiographic images were performed with IV 

contrast. Coronal and sagittal 3-D MIP reformats were performed.

 

Exposure: One or more of the following individualized dose reduction 

techniques were utilized for this examination:  1. Automated exposure 

control  2. Adjustment of the mA and/or kV according to patient size  3. 

Use of iterative reconstruction technique

 

FINDINGS:

 

 

The visualized thyroid gland grossly appears unremarkable. The central 

airways are patent.

 

Mild cardiomegaly. The caliber of the aorta grossly appears unremarkable. 

Mild aortic atherosclerosis.

 

There is no evidence of filling defect identified in the main pulmonary 

arterial trunk and right and left main pulmonary arteries. There is no 

evidence of filling defect identified in the lobar, segmental branches of 

the pulmonary arteries.

 

The lungs are clear. The visualized liver, spleen, adrenals and grossly 

appears unremarkable.

 

Mild degenerative changes thoracic spine.

 

 

IMPRESSION:

 

1. No evidence of pulmonary embolism.

 

2. The lungs are clear.

 

3. Mild cardiomegaly.

 

Electronically signed by: Jerman Newell MD (6/12/2017 5:39 AM)

## 2017-06-12 NOTE — RAD
EXAM: Chest one view.



HISTORY: Chest pain.



COMPARISON: 1/24/2011.



FINDINGS: A frontal view of the chest is obtained.    



There are no confluent infiltrates. There is no pneumothorax or pleural

effusion. The heart is not enlarged.    



IMPRESSION: 

1. No confluent infiltrates.

## 2017-06-12 NOTE — ACF
Admit Criteria Forms


                            CARDIOLOGY [facility]





Clinical Indications for Admission to Inpatient Care


    


                                                                               (

Place 'X' for any and all applicable criteria): 





Hospital admission is needed for appropriate care of the patient because of ANY 

ONE of the following (1): 





[ ] I.    Hemodynamic instability as indicated by ALL of the following (1)(2)(3)

(4)(5)


         [ ]a)   Vital signs or other findings not as expected for chronic 

patient condition or baseline


         [ ]b)   Instability indicated by ANY ONE of the following:


                  [ ]i)     Hypotension


                  [ ]ii)    Symptomatic Tachycardia unresponsive to treatment (

e.g., analgesia, fluids, sedation as indicated)


                  [ ]iii)   Inadequate perfusion indicated by ANY ONE of the 

following:


                            [ ] 1)   Lactic acidosis (> 2 mmol/L)


                            [ ] 2)   New abnormal capillary refill (> 3 seconds)


                            [ ] 3)   Reduced urine output


                            [ ] 4)   New altered mental status


                  [ ]iv)   Orthostatic vital sign changes unresponsive to 

treatment (e.g., fluids)              


                  [ ]v)    IV inotropic or vasopressor medication required to 

maintain adequate blood pressure or perfusion


[ ] II.  Severe heart failure as indicated by ANY ONE of the following(17)(18)


         [ ]a)  Respiratory distress        


         [ ]b)  Hypotension


         [ ]c)  Anasarca (refractory to outpatient therapy) 


         [ ]d)  Cardiac arrhythmias of immediate concern 


         [ ]e)  Myocardial ischemia


[ ] III. Cardiac arrhythmias or findings of immediate concern indicated by ANY 

ONE of the following (19)(20):


         [ ] a)  Heart rhythms that are inherently dangerous or unstable 

indicated by ANY ONE of the following (21)(22)(23):


                 [ ] i)    Resuscitated ventricular fibrillation or cardiac 

arrest


                 [ ] ii)   Ventricular escape rhythm


                 [ ] iii)  Sustained ventricular tachycardia (30 seconds or 

more of ventricular rhythm at greater than 100 beats per minute)


                 [ ] iv)  Nonsustained ventricular tachycardia and ANY ONE of 

the following:


                          [ ] 1)    Suspected cardiac ischemia as cause or 

consequence of ventricular tachycardia    


                          [ ] 2)    In setting of acute myocarditis         


         [ ] b)  Unstable cardiac conduction defects indicated by ANY ONE of 

the following(23)(24)(25)


                  [ ] i)    Type II second-degree atrioventricular block    


                  [ ]ii)    Third-degree atrioventricular block                


                  [ ]iii)    New-onset left bundle branch block with suspected 

myocardial ischemia


         [ ]c)   Any heart rhythm and ANY ONE of the following (21)(22)(26)(27) 

(28)


                  [ ] i)    Continuous long-term ECG monitoring needed (e.g., 

initiation of drug requiring monitoring for more than 24 hours)


                  [ ] ii)   Patient has automatic implanted cardioverter 

defibrillator that is repeatedly firing, malfunctioning, or in need of 

immediate 


                            adjustment of settings beyond the scope of 

ambulatory or observation care


        [ ]d)    Heart rhythms of concern due to ANY ONE of the following:


                  [ ] i)    Hypotension


                  [ ] ii)    Respiratory distress


                  [ ] iii)   Association with other significant symptoms (e.g., 

bradycardia with syncope or ongoing dizziness, supraventricular 


                            tachycardia with chest pain (14)(15)(17)


[ ] IV.   Monitoring for cardiac contusion beyond the scope of observation care 

needed [A](30)(31)(32)


[ ] V.    Surgical or device complication (e.g., valve replacement complication

, pacemaker dysfunction) (35)(41)(44)(45)(46)


[ ] VI.   Inpatient palliative care needed. [B](49) Also use Inpatient 

Palliative Care Criteria


[ ] VII.  Nonbacterial thrombotic (marantic) endocarditis (36)(43)(47)(48)


[X] VIII. Cardiology condition, symptom, or finding for which emergency and 

observation care has failed or are not considered appropriate.


[ ] IX.   Acute valvular disease requiring inpatient as indicated by ANY ONE of 

the following (41)


          [ ]a)   Acute valvular regurgitation (42)


          [ ]b)   Noninfectious valvulitis (43)


          [ ]c)   Obstructive valve thrombosis 


          [ ]d)   Paravalvular leak


          [ ]e)   Other significant valvular disorder remaining after emergency 

or observation level of care (as appropriate)


[ ]X.    Pericardial disease requiring inpatient treatment as indicated by ANY 

ONE of the following (33)(34)(35)(36)(37)           


          [ ]a)   Suspected tamponade (38)(39)(40)


          [ ]b)   Hemopericardium


          [ ]c)   Other significant pericardial disorder remaining after 

emergency or observation level of care (as appropriate)


[ ] XI.  Cardiac ischemia beyond scope of emergency and observation care. 


[ ] XII. Hypertension requiring inpatient treatment as indicated by ANY ONE of 

the following (6)(7)(8)


         [ ]a)    SBP greater than 220 mm Hg or DBP greater than 120 mmHg 

despite treatment


         [ ]b)    SBP greater than 140 mm Hg or DBP greater than 100 mm Hg with 

evidence of acute end organ damage as indicated


                   by ANY ONE of the following


                   [ ] i)      Encephalopathy


                   [ ] ii)     Acute renal failure as indicated by new onset of 

ANY ONE of the following (9)(10)(11)(12)(13)


                               [ ]1)  3-fold rise in serum creatinine from 

baseline


                               [ ]2)  Serum creatinine greater than 4 mg/dL (

354 micromoles/L) with acute rise greater than 0.5 mg/dL (44.2 micromoles/L)


                               [ ]3)  Reduction of more than 75% in estimated 

glomerular filtration rate from baseline 


                               [ ]4)  Estimated glomerular filtration rate less 

than 35 mL/min/1.73m2 (0.59 mL/sec/1.73m2) in child up to 18 years of age


                               [ ]5)  Cessation of urine output indicated by ALL

 of the following


                                       [ ]A.   Adequate volume status


                                       [ ]B.   Inadequate urine output as 

indicated by ANY ONE of the following       


                                                [ ]a.   Urine output less than 

0.3 mL/kg/hr for 24 hours


                                                [ ]b.   Anuria (urine output 

less than 0.1 mL/kg/hr) for 12 hours 


                  [ ] iii)      Aortic dissection


                  [ ] iv)      Myocardial Ischemia


                  [ ] v)       Left ventricular heart failure 


                  [ ]vi)       Retinal Hemorrhage


                  [ ]vii)      Other significant finding


         [ ]c)   Hypertension in child requiring inpatient treatment as 

indicated by ALL of the following(14)(15)(16)


                  [ ] i)        Outpatient treatment not effective, not 

available, or not appropriate               


                  [ ]ii)        SBP or DBP greater than 95th percentile for age


                  [ ]iii)       Evidence of acute end organ damage as indicated 

by ANY ONE of the following 


                               [ ]1)     Altered mental status


                               [ ]2)    Acute renal failure as indicated by new 

onset of ANY ONE of the following(9)(10)(11)(12)(13)


                                         [ ]A.    3-fold rise in serum 

creatinine from baseline


                                         [ ]B.    Serum creatinine greater than 

4 mg/dL (354 micromoles/L) with acute rise greater than 0.5 mg/dL (44.2 

micromoles/L)


                                         [ ]C.    Reduction of more than 75% in 

estimated glomerular filtration rate from baseline


                                         [ ]D.    Estimated glomerular 

filtration rate less than 35 mL/min/1.73m2 (0.59 mL/sec/1.73m2) in child up to 

18 years of age 


                                         [ ]E.    Cessation of urine output 

indicated by ALL of the following 


                                                   [ ]a.  Adequate volume status


                                                   [ ]b.  Inadequate urine 

output as indicated by ANY ONE of the following 


                                                           [ ]i)    Urine 

output less than 0.3 mL/kg/hr for 24 hours


                                                           [ ]ii)   Anuria (

urine output less than 0.1 mL/kg/hr) for 12 hours                              

  


                                                    [ ]3)  Severe headache


                              [ ]4)  Visual disturbance 


                              [ ]5)  Retinal hemorrhage


                              [ ]6)  Other significant finding


[ ]XIII.   Complications of transplanted heart indicated by ANY ONE of the 

following(61):


           [ ]a)  Acute graft rejection requiring inpatient management (eg, 

intravenous immunosuppression)(62)(63)


           [ ]b)  Acute graft heart failure indicated by ANY ONE of the 

following(64):


                   [ ]i)   Hemodynamic instability


                   [ ]ii)  Cardiac arrhythmias of immediate concern


                   [ ]iii) Pulmonary edema that is very severe (eg, mechanical 

ventilation needed,


                          imminent or likely, need for 100% oxygen to keep 

oxygen saturation above 90%)


                   [ ]iv) Pulmonary edema that is persistent as indicated by ALL

 of the following:


                          [ ]1)   New need for oxygen therapy to keep oxygen 

saturation above 90% (or increased FiO2 need from baseline)


                          [ ]2)   Has not improved sufficiently with emergency 

department or observation


                                   care IV diuretics or other heart failure 

treatments[E]


                   [ ]v)   Altered mental status that is severe or persistent


                   [ ]vi)   Increased creatinine (new on laboratory test) with 

reduction of more than 50% in


                            estimated glomerular filtration rate from baseline


                   [ ]vii)  Progressively (ongoing) rising creatinine (known 

from past laboratory test) with


                            reduction of more than 25% in estimated glomerular 

filtration rate from baseline


                   [ ]viii) Acute renal failure


                   [ ]ix)  Acute peripheral ischemia (eg, examination shows 

pulseless, cool, mottled, or cyanotic extremity)


                   [ ]x)   Pulmonary artery catheter monitoring needed


                   [ ]xi)  Other sign or symptom of heart failure requiring 

inpatient treatment (ie, too severe or


                            not responsive to outpatient and observation care 

treatment)


        [ ]c)    Infection requiring inpatient management (eg, Hemodynamic 

instability, need for intravenous antimicrobial treatment)(66)(67)(68)(69)(70)


        [ ]d)   Cardiac allograft vasculopathy requiring inpatient management (

eg evidence of cardiac ischemia)(71)


        [ ]e)   Other complication of transplanted heart (eg, stroke, severe 

pulmonary hypertension, severe valvular 


                 dysfunction) requiring inpatient management(72)








The original [facility] content created by [employer] has been revised. 


The portions of the content which have been revised are identified through the 

use of italic text or in bold, and [facility] 


has neither reviewed nor approved the modified material. All other unmodified 

content is copyright  [facility].





Please see references footnoted in the original [facility] edition 

2016











LAURA ALEJO Jun 12, 2017 11:48

## 2017-06-12 NOTE — RAD
EXAM: Chest one view.



HISTORY: Shortness of breath, wheezing.



COMPARISON: 6/12/2017 at 03 36.



FINDINGS: A frontal view of the chest is obtained.    



There are no confluent infiltrates. There is no pneumothorax or pleural

effusion. The heart is not enlarged.    



IMPRESSION: 

1. No confluent infiltrates.

## 2017-06-13 VITALS — DIASTOLIC BLOOD PRESSURE: 92 MMHG | SYSTOLIC BLOOD PRESSURE: 127 MMHG

## 2017-06-13 VITALS — SYSTOLIC BLOOD PRESSURE: 133 MMHG | DIASTOLIC BLOOD PRESSURE: 87 MMHG

## 2017-06-13 VITALS — SYSTOLIC BLOOD PRESSURE: 136 MMHG | DIASTOLIC BLOOD PRESSURE: 94 MMHG

## 2017-06-13 LAB
ANION GAP SERPL CALC-SCNC: 5 MMOL/L (ref 6–14)
BASOPHILS # BLD AUTO: 0 X10^3/UL (ref 0–0.2)
BASOPHILS NFR BLD: 0 % (ref 0–3)
BUN SERPL-MCNC: 11 MG/DL (ref 8–26)
CALCIUM SERPL-MCNC: 7.9 MG/DL (ref 8.5–10.1)
CHLORIDE SERPL-SCNC: 107 MMOL/L (ref 98–107)
CO2 SERPL-SCNC: 28 MMOL/L (ref 21–32)
CREAT SERPL-MCNC: 1 MG/DL (ref 0.7–1.3)
EOSINOPHIL NFR BLD: 3 % (ref 0–3)
ERYTHROCYTE [DISTWIDTH] IN BLOOD BY AUTOMATED COUNT: 12.9 % (ref 11.5–14.5)
GFR SERPLBLD BASED ON 1.73 SQ M-ARVRAT: 80.4 ML/MIN
GLUCOSE SERPL-MCNC: 111 MG/DL (ref 70–99)
HCT VFR BLD CALC: 40.7 % (ref 39–53)
HGB BLD-MCNC: 13.8 G/DL (ref 13–17.5)
LYMPHOCYTES # BLD: 2.3 X10^3/UL (ref 1–4.8)
LYMPHOCYTES NFR BLD AUTO: 30 % (ref 24–48)
MCH RBC QN AUTO: 30 PG (ref 25–35)
MCHC RBC AUTO-ENTMCNC: 34 G/DL (ref 31–37)
MCV RBC AUTO: 89 FL (ref 79–100)
MONOCYTES NFR BLD: 7 % (ref 0–9)
NEUTROPHILS NFR BLD AUTO: 60 % (ref 31–73)
PLATELET # BLD AUTO: 217 X10^3/UL (ref 140–400)
POTASSIUM SERPL-SCNC: 4.1 MMOL/L (ref 3.5–5.1)
RBC # BLD AUTO: 4.58 X10^6/UL (ref 4.3–5.7)
SODIUM SERPL-SCNC: 140 MMOL/L (ref 136–145)
WBC # BLD AUTO: 7.6 X10^3/UL (ref 4–11)

## 2017-06-13 PROCEDURE — 0DJ08ZZ INSPECTION OF UPPER INTESTINAL TRACT, VIA NATURAL OR ARTIFICIAL OPENING ENDOSCOPIC: ICD-10-PCS

## 2017-06-13 RX ADMIN — PANTOPRAZOLE SODIUM SCH MG: 40 TABLET, DELAYED RELEASE ORAL at 07:25

## 2017-06-13 NOTE — PDOC4
PROCEDURE


Procedure


EGD





Ind: NCCP/dyspepsia





Meds: per anesthesia





Findings:





E--less than or equal to grade I reflux at 40cm.





G--Non-specific linear erythema, antrum.





D--Normal to second portion.





Josesito. well.





IMP: Mild reflux, though likely cause of NCCP





REC: Intensive treatment of reflux with PPI not mandatory, though acceptable if 

symptoms warrant.


         OK to dismiss from my standpoint at your discretion.


          Should consider screening colonoscopy at age 50.





Thanks.











CRISTÓBAL KNOWLES MD Jun 13, 2017 12:15

## 2017-06-13 NOTE — HP
ADMIT DATE:  06/12/2017



CHIEF COMPLAINT:  Chest pain, epigastric pain.



HISTORY OF PRESENT ILLNESS:  A 46-year-old male patient with a history of GERD,

presented to the ER with complaints of chest pain.  The patient woke up in the

middle of the night with severe " really bad episode of GERD "located in the

epigastric, central area of the chest, which got better with GI cocktail and

nitro.  The patient had a coronary artery catheterization 10 years ago and he

has a brother and father who had coronary artery disease when they were in young

age.  Today, he denies any syncope, nausea, vomiting or abdominal pain or any

sweating.



PAST MEDICAL HISTORY:  Hypertension, hyperlipidemia, GERD.



PAST SURGICAL HISTORY:  Coronary artery catheterization in the past, neck tumor

removed as a child.



FAMILY HISTORY: Coronary artery disease, hypertension.



PERSONAL HISTORY: No smoking, no alcohol, no drug abuse.



ALLERGIES:  NKDA.



REVIEW OF SYSTEMS AND PHYSICAL EXAMINATION:  Please see my electronic H and P.



MEDICATIONS:  Reviewed, reconciled.



LABORATORY FINDINGS:  CBC:  WBC 11.6, hemoglobin is 16.2, MCV is 89, platelets

260.  Chemistries:  Sodium 140, potassium 3.5, chloride is 104, anion gap is 11,

creatinine is 1.4, BUN and creatinine ratio 10, glucose 154.  First set of

troponins less than 0.017 and triglycerides ____117, cholesterol 223, LDL is

____, VLDL is 23, HDL 33.



D-dimer less than 0.27.



IMAGING STUDIES:  Chest x-ray, no acute process seen.  CTA of the chest, no

evidence of PE, lungs are clear, mild cardiomegaly.



EKG:  Repeat EKG ordered.



ASSESSMENT:

1.  Chest pain, possible differential is acute coronary syndrome versus severe

gastritis versus gastroesophageal reflux disease.

2.  Hypertension.

3.  Hyperlipidemia, uncontrolled.

4.  Abnormal creatinine, 1.4.



PLAN:

1.  He has been placed on telemetry floor.  Continue to monitor on telemetry and

we will obtain 3 sets of troponins.  Continue IV hydration.  Protonix has been

started.  Consult Gastroenterology and Cardiology.

2.  Lipitor has been started for hyperlipidemia.

3.  I am not able to obtain EKG report.  We will order another EKG.

4.  Stress test has been scheduled today.

 



______________________________

CARLY SERRA MD



DR:  ERINN/mya  JOB#:  [id number] / [id number]

DD:  06/12/2017 14:56  DT:  06/12/2017 19:33

JB

## 2017-06-13 NOTE — PDOC2
CONSULT


Date of Consult


Date of Consult


DATE: 6/13/17 


TIME: 16:46





History of Present Illness


Reason for Visit:


The patient is a 46 year old male who was admitted with upper abdominal and 

lower chest pain which began 2 days ago.  He admits to having prior episodes of 

the pain in the past that wasn't as severe.  The pain has not radiated 

significantly,  He underwent a cardiac examination which was unremarkable.  A 

sonogram then showed gallstones.





Past Medical History


Cardiovascular:  HTN, Hyperlipidemia


Pulmonary:  No pertinent hx


GI:  GERD


Heme/Onc:  No pertinent hx


Hepatobiliary:  No pertinent hx


Psych:  No pertinent hx


Rheumatologic:  No pertinent hx


Infectious disease:  No pertinent hx


ENT:  No pertinent hx


Renal/:  No pertinent hx


Endocrine:  No pertinent hx


Dermatology:  No pertinent hx





Past Surgical History


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





Family History


Family History:  Coronary Artery Disease, Hypertension





Social History


No


ALCOHOL:  none


Drugs:  None


Lives:  with Family





Current Problem List


Problem List


Problems


Medical Problems:


(1) Chest pain


Status: Acute  











Current Medications


Current Medications





Current Medications


Multi-Ingredient Mouthwash/Gargle (Gi Cocktail Single Dose) 15 ml 1X  ONCE SWSW

  Last administered on 6/12/17at 04:40;  Start 6/12/17 at 04:30;  Stop 6/12/17 

at 04:31;  Status DC


Morphine Sulfate 4 mg 1X  ONCE IV  Last administered on 6/12/17at 04:40;  Start 

6/12/17 at 04:30;  Stop 6/12/17 at 04:31;  Status DC


Ondansetron HCl (Zofran) 4 mg 1X  ONCE IV  Last administered on 6/12/17at 04:40

;  Start 6/12/17 at 04:30;  Stop 6/12/17 at 04:31;  Status DC


Ondansetron HCl (Zofran) 4 mg PRN Q8HRS  PRN IV NAUSEA/VOMITING;  Start 6/12/17 

at 04:30;  Stop 6/13/17 at 04:29;  Status DC


Morphine Sulfate 4 mg PRN Q2HR  PRN IV SEVERE PAIN;  Start 6/12/17 at 04:30;  

Stop 6/13/17 at 04:29;  Status DC


Sodium Chloride 1,000 ml @  125 mls/hr Q8H IV  Last administered on 6/12/17at 06

:32;  Start 6/12/17 at 04:29;  Stop 6/13/17 at 04:28;  Status DC


Acetaminophen (Tylenol) 650 mg PRN Q4HRS  PRN PO FEVER;  Start 6/12/17 at 04:30

;  Stop 6/13/17 at 04:29;  Status DC


Nitroglycerin (Nitrostat) 0.4 mg PRN Q5MIN  PRN SL CHEST PAIN Last administered 

on 6/12/17at 06:24;  Start 6/12/17 at 04:30;  Stop 6/13/17 at 04:29;  Status DC


Iohexol (Omnipaque 300 Mg/ml) 60 ml 1X  ONCE IV  Last administered on 6/12/17at 

05:14;  Start 6/12/17 at 05:30;  Stop 6/12/17 at 05:31;  Status DC


Iohexol (Omnipaque 300 Mg/ml) 75 ml STK-MED ONCE .ROUTE ;  Start 6/12/17 at 05:

02;  Stop 6/12/17 at 05:03;  Status DC


Info (Do NOT chart on this entry -- for MONITORING) 1 each PRN DAILY  PRN MC 

SEE COMMENTS;  Start 6/12/17 at 05:15;  Stop 6/14/17 at 05:14


Aspirin (Shital Aspirin) 325 mg 1X  ONCE PO  Last administered on 6/12/17at 10:08

;  Start 6/12/17 at 09:45;  Stop 6/12/17 at 09:46;  Status DC


Acetaminophen (Tylenol) 325 mg PRN Q6HRS  PRN PO MILD PAIN / TEMP;  Start 6/12/ 17 at 11:30


Acetaminophen/ Hydrocodone Bitart (Lortab 5/325) 1 tab PRN Q6HRS  PRN PO 

MODERATE TO SEVERE PAIN;  Start 6/12/17 at 11:30


Hydralazine HCl (Apresoline) 10 mg PRN Q4HRS  PRN IVP ELEVATED BP, SEE COMMENTS

;  Start 6/12/17 at 11:30


Ondansetron HCl (Zofran) 4 mg PRN Q8HRS  PRN IV NAUSEA/VOMITING;  Start 6/12/17 

at 11:30


Albuterol Sulfate (Ventolin Neb Soln) 2.5 mg PRN Q4HRS  PRN NEB SHORTNESS OF 

BREATH;  Start 6/12/17 at 11:30


Regadenoson (Lexiscan) 0.4 mg 1X  ONCE IV  Last administered on 6/12/17at 12:15

;  Start 6/12/17 at 11:45;  Stop 6/12/17 at 11:46;  Status DC


Pantoprazole Sodium (Protonix) 40 mg DAILYAC PO  Last administered on 6/13/17at 

07:25;  Start 6/12/17 at 15:00


Atorvastatin Calcium (Lipitor) 40 mg QHS PO  Last administered on 6/12/17at 20:

22;  Start 6/12/17 at 21:00


Ringer's Solution 1,000 ml @  50 mls/hr Q20H IV  Last administered on 6/13/17at 

07:25;  Start 6/13/17 at 07:05;  Stop 6/13/17 at 19:04


Midazolam HCl (Versed) 2 mg PRN 1X  PRN IV PRIOR TO PROCEDURE;  Start 6/13/17 

at 11:00;  Stop 6/14/17 at 10:59


Fentanyl Citrate (Fentanyl 2ml Vial) 25 mcg PRN Q5MIN  PRN IV X 2 DOSES FOR PAIN

;  Start 6/13/17 at 11:00;  Stop 6/14/17 at 10:59


Fentanyl Citrate (Fentanyl 2ml Vial) 50 mcg PRN Q5MIN  PRN IV X 2 DOSES FOR PAIN

;  Start 6/13/17 at 11:00;  Stop 6/14/17 at 10:59


Ringer's Solution 1,000 ml @  125 mls/hr Q8H IV ;  Start 6/13/17 at 10:58;  

Stop 6/13/17 at 22:57


Lidocaine HCl 2 ml 1X PRN  PRN ID IV START;  Start 6/13/17 at 11:00;  Stop 6/14/ 17 at 10:59


Propofol 20 ml @ As Directed STK-MED ONCE IV ;  Start 6/13/17 at 11:59;  Stop 6/ 13/17 at 12:00;  Status DC


Lidocaine HCl (Lidocaine Pf 2% Vial) 5 ml STK-MED ONCE .ROUTE ;  Start 6/13/17 

at 11:59;  Stop 6/13/17 at 12:00;  Status DC





Active Scripts


Active


Protonix (Pantoprazole Sodium) 40 Mg Tablet.dr 40 Mg PO DAILY 30 Days





Allergies


Allergies:  


Coded Allergies:  


     No Known Drug Allergies (Unverified , 6/13/17)





ROS


General:  No: Chills, Night Sweats, Fatigue, Malaise, Appetite, Other


PSYCHOLOGICAL ROS:  No: Anxiety, Behavioral Disorder, Concentration difficultie

, Decreased libido, Depression, Disorientation, Hallucinations, Hostility, 

Irritablity, Memory difficulties, Mood Swings, Obsessive thoughts, Physical 

abuse, Sexual abuse, Sleep disturbances, Suicidal ideation, Other


Eyes:  No Blurry vision, No Decreased vision, No Double vision, No Dry eyes, No 

Excessive tearing, No Eye Pain, No Itchy Eyes, No Loss of vision, No Photophobia

, No Scotomata, No Uses contacts, No Uses glasses, No Other


Hematological and Lymphatic:  No: Bleeding Problems, Blood Clots, Blood 

Transfusions, Brusing, Night Sweats, Pallor, Swollen Lymph Nodes, Other


ENDOCRINE:  No: Breast Changes, Galactorrhea, Hair Pattern Changes, Hot Flashes

, Malaise/lethargy, Mood Swings, Palpitations, Polydipsia/polyuria, Skin Changes

, Temperature Intolerance, Unexpected Weight Changes, Other


Cardiovascular:  yes Chest Pain


Gastrointestinal:  Yes Abdominal Pain


Genitourinary:  No Dysuria, No Frequency, No Incontinence, No Hematuria, No 

Retention, No Discharge, No Urgency, No Pain, No Flank Pain, No Other, No , No 

, No , No , No , No , No 


Musculoskeletal:  No Gait Disturbance, No Joint Pain, No Joint Stiffness, No 

Joint Swelling, No Muscle Pain, No Muscular Weakness, No Pain In:, No Swelling 

In:, No Other


Neurological:  No Behavorial Changes, No Bowel/Bladder ControlChng, No Confusion

, No Dizziness, No Gait Disturbance, No Headaches, No Impaired Coord/balance, 

No Memory Loss, No Numbness/Tingling, No Seizures, No Speech Problems, No 

Tremors, No Visual Changes, No Weakness, No Other


Skin:  No Dry Skin, No Eczema, No Hair Changes, No Lumps, No Mole Changes, No 

Mottling, No Nail Changes, No Pruritus, No Rash, No Skin Lesion Changes, No 

Other, No Acne





Physical Exam


General:  Alert, Oriented X3, Cooperative, No acute distress


HEENT:  Atraumatic


Lungs:  Clear to auscultation


Abdomen:  Soft (very slightly tender upper mid abdomen, morbidly obese)


Extremities:  No clubbing, No cyanosis


Neuro:  Normal gait, Normal speech


MUSCULOSKELETAL:  No joint tenderness, No deformity





Vitals


VITALS





Vital Signs








  Date Time  Temp Pulse Resp B/P (MAP) Pulse Ox O2 Delivery O2 Flow Rate FiO2


 


6/13/17 15:00 98.1 64 18 136/94 (108) 95 Room Air  





 98.1       


 


6/13/17 12:13       2 











Labs


Labs





Laboratory Tests








Test


  6/12/17


03:25 6/12/17


10:05 6/12/17


16:40 6/13/17


04:00


 


White Blood Count


  11.6 x10^3/uL


(4.0-11.0) 


  


  7.6 x10^3/uL


(4.0-11.0)


 


Red Blood Count


  5.36 x10^6/uL


(4.30-5.70) 


  


  4.58 x10^6/uL


(4.30-5.70)


 


Hemoglobin


  16.0 g/dL


(13.0-17.5) 


  


  13.8 g/dL


(13.0-17.5)


 


Hematocrit


  47.9 %


(39.0-53.0) 


  


  40.7 %


(39.0-53.0)


 


Mean Corpuscular Volume 89 fL ()    89 fL () 


 


Mean Corpuscular Hemoglobin 30 pg (25-35)    30 pg (25-35) 


 


Mean Corpuscular Hemoglobin


Concent 34 g/dL


(31-37) 


  


  34 g/dL


(31-37)


 


Red Cell Distribution Width


  13.0 %


(11.5-14.5) 


  


  12.9 %


(11.5-14.5)


 


Platelet Count


  260 x10^3/uL


(140-400) 


  


  217 x10^3/uL


(140-400)


 


Neutrophils (%) (Auto) 57 % (31-73)    60 % (31-73) 


 


Lymphocytes (%) (Auto) 31 % (24-48)    30 % (24-48) 


 


Monocytes (%) (Auto) 10 % (0-9)    7 % (0-9) 


 


Eosinophils (%) (Auto) 3 % (0-3)    3 % (0-3) 


 


Basophils (%) (Auto) 0 % (0-3)    0 % (0-3) 


 


Neutrophils # (Auto)


  6.6 x10^3uL


(1.8-7.7) 


  


  4.5 x10^3uL


(1.8-7.7)


 


Lymphocytes # (Auto)


  3.5 x10^3/uL


(1.0-4.8) 


  


  2.3 x10^3/uL


(1.0-4.8)


 


Monocytes # (Auto)


  1.1 x10^3/uL


(0.0-1.1) 


  


  0.5 x10^3/uL


(0.0-1.1)


 


Eosinophils # (Auto)


  0.3 x10^3/uL


(0.0-0.7) 


  


  0.2 x10^3/uL


(0.0-0.7)


 


Basophils # (Auto)


  0.0 x10^3/uL


(0.0-0.2) 


  


  0.0 x10^3/uL


(0.0-0.2)


 


D-Dimer (Concepcion)


  < 0.27


ug/mlFEU 


  


  


 


 


Sodium Level


  140 mmol/L


(136-145) 


  


  140 mmol/L


(136-145)


 


Potassium Level


  3.5 mmol/L


(3.5-5.1) 


  


  4.1 mmol/L


(3.5-5.1)


 


Chloride Level


  104 mmol/L


() 


  


  107 mmol/L


()


 


Carbon Dioxide Level


  25 mmol/L


(21-32) 


  


  28 mmol/L


(21-32)


 


Anion Gap 11 (6-14)    5 (6-14) 


 


Blood Urea Nitrogen


  14 mg/dL


(8-26) 


  


  11 mg/dL


(8-26)


 


Creatinine


  1.4 mg/dL


(0.7-1.3) 


  


  1.0 mg/dL


(0.7-1.3)


 


Estimated GFR


(Cockcroft-Gault) 54.6 


  


  


  80.4 


 


 


BUN/Creatinine Ratio 10 (6-20)    


 


Glucose Level


  154 mg/dL


(70-99) 


  


  111 mg/dL


(70-99)


 


Calcium Level


  8.7 mg/dL


(8.5-10.1) 


  


  7.9 mg/dL


(8.5-10.1)


 


Total Bilirubin


  0.5 mg/dL


(0.2-1.0) 


  


  


 


 


Aspartate Amino Transf


(AST/SGOT) 17 U/L (15-37) 


  


  


  


 


 


Alanine Aminotransferase


(ALT/SGPT) 38 U/L (16-63) 


  


  


  


 


 


Alkaline Phosphatase


  95 U/L


() 


  


  


 


 


Troponin I Quantitative


  < 0.017 ng/mL


(0.000-0.055) < 0.017 ng/mL


(0.000-0.055) < 0.017 ng/mL


(0.000-0.055) 


 


 


Total Protein


  8.3 g/dL


(6.4-8.2) 


  


  


 


 


Albumin


  4.0 g/dL


(3.4-5.0) 


  


  


 


 


Albumin/Globulin Ratio 0.9 (1.0-1.7)    


 


Triglycerides Level


  117 mg/dL


(0-150) 


  


  


 


 


Cholesterol Level


  223 mg/dL


(0-200) 


  


  


 


 


LDL Cholesterol, Calculated


  167 mg/dL


(0-100) 


  


  


 


 


VLDL Cholesterol, Calculated


  23 mg/dL


(0-40) 


  


  


 


 


Non-HDL Cholesterol Calculated


  190 mg/dL


(0-129) 


  


  


 


 


HDL Cholesterol


  33 mg/dL


(40-60) 


  


  


 


 


Cholesterol/HDL Ratio 6.8    








Laboratory Tests








Test


  6/13/17


04:00


 


White Blood Count


  7.6 x10^3/uL


(4.0-11.0)


 


Red Blood Count


  4.58 x10^6/uL


(4.30-5.70)


 


Hemoglobin


  13.8 g/dL


(13.0-17.5)


 


Hematocrit


  40.7 %


(39.0-53.0)


 


Mean Corpuscular Volume 89 fL () 


 


Mean Corpuscular Hemoglobin 30 pg (25-35) 


 


Mean Corpuscular Hemoglobin


Concent 34 g/dL


(31-37)


 


Red Cell Distribution Width


  12.9 %


(11.5-14.5)


 


Platelet Count


  217 x10^3/uL


(140-400)


 


Neutrophils (%) (Auto) 60 % (31-73) 


 


Lymphocytes (%) (Auto) 30 % (24-48) 


 


Monocytes (%) (Auto) 7 % (0-9) 


 


Eosinophils (%) (Auto) 3 % (0-3) 


 


Basophils (%) (Auto) 0 % (0-3) 


 


Neutrophils # (Auto)


  4.5 x10^3uL


(1.8-7.7)


 


Lymphocytes # (Auto)


  2.3 x10^3/uL


(1.0-4.8)


 


Monocytes # (Auto)


  0.5 x10^3/uL


(0.0-1.1)


 


Eosinophils # (Auto)


  0.2 x10^3/uL


(0.0-0.7)


 


Basophils # (Auto)


  0.0 x10^3/uL


(0.0-0.2)


 


Sodium Level


  140 mmol/L


(136-145)


 


Potassium Level


  4.1 mmol/L


(3.5-5.1)


 


Chloride Level


  107 mmol/L


()


 


Carbon Dioxide Level


  28 mmol/L


(21-32)


 


Anion Gap 5 (6-14) 


 


Blood Urea Nitrogen


  11 mg/dL


(8-26)


 


Creatinine


  1.0 mg/dL


(0.7-1.3)


 


Estimated GFR


(Cockcroft-Gault) 80.4 


 


 


Glucose Level


  111 mg/dL


(70-99)


 


Calcium Level


  7.9 mg/dL


(8.5-10.1)











Images


Images


Sonogram





IMPRESSION:


1. Cholelithiasis and biliary sludge with mild gallbladder wall thickening.


Correlate for pain and evidence of infection to exclude acute cholecystitis.


2. Diffuse hepatic steatosis.


3. The pancreas is obscured by bowel gas currently.





Assessment/Plan


Assessment/Plan


Suspect calculous cholecystitis;  his WBC normalized and his pain is nearly 

resolved.   He is hoping to go home and arrange for surgery as outpt.  Plan for 

FU in the office as outpt for surgical planning.  Thanks for the consult!











ANGELO HAMILTON MD Jun 13, 2017 16:49

## 2017-06-13 NOTE — EKG
[facility]

              [address] [place] [address]

Test Date:    2017               Test Time:    03:22:54

Pat Name:     PAU DUMONT            Department:   

Patient ID:   [id number]           Room:          

Gender:       M                        Technician:   

:          1970               Requested By: MIGUEL BOSWELL

Order Number: [id number]            Reading MD:   Shaheen Pate

                                 Measurements

Intervals                              Axis          

Rate:         98                       P:            -71

WA:           136                      QRS:          -167

QRSD:         76                       T:            177

QT:           344                                    

QTc:          441                                    

                           Interpretive Statements

SINUS RHYTHM

LIMB LEAD MISPLACEMENT



Electronically Signed On 2017 9:36:30 CDT by Shaheen Pate

## 2017-06-13 NOTE — RAD
EXAM: ABDOMINAL ULTRASOUND.



HISTORY: Abdominal pain.



COMPARISON: None.



FINDINGS: Sonographic evaluation of the abdomen was performed.



Hyperechogenicity of the hepatic parenchyma is consistent with diffuse hepatic

steatosis. This lowers sensitivity for focal lesions. There are no focal

lesions. The spleen measures 9.6 cm.



There is a stone in the gallbladder neck. The remainder of the gallbladder is

filled with sludge or dense bile. There is no pericholecystic fluid. There is

mild gallbladder wall thickening. There is no sonographic Granados sign. The

common duct measures 4 mm. The pancreas is obscured currently.



The right kidney measures 11.5 cm. Cortical thickness and echogenicity are

preserved. There is no hydronephrosis.     The left kidney measures 11.6 cm.

Cortical thickness and echogenicity are preserved. There is no hydronephrosis.

   



The visualized portions of the abdominal aorta and inferior vena cava are

grossly patent and normal in caliber.



IMPRESSION:

1. Cholelithiasis and biliary sludge with mild gallbladder wall thickening.

Correlate for pain and evidence of infection to exclude acute cholecystitis.

2. Diffuse hepatic steatosis.

3. The pancreas is obscured by bowel gas currently.

## 2017-06-13 NOTE — EKG
[facility]

              [address] [place] [address]

Test Date:    2017               Test Time:    13:47:59

Pat Name:     PAU DUMONT            Department:   

Patient ID:   [id number]           Room:         246 1

Gender:       M                        Technician:   JERROD

:          1970               Requested By: JOSE LUIS BO

Order Number: [id number]            Reading MD:   Shaheen Pate

                                 Measurements

Intervals                              Axis          

Rate:         63                       P:            16

NE:           200                      QRS:          0

QRSD:         78                       T:            2

QT:           406                                    

QTc:          419                                    

                           Interpretive Statements

SINUS RHYTHM



Electronically Signed On 2017 16:58:57 CDT by Shaheen Pate

## 2017-06-15 NOTE — DS
DATE OF DISCHARGE:  06/13/2017



DISCHARGE DIAGNOSES:

1.  Chest pain due to cholelithiasis and gastroesophageal reflux disease.

2.  Hypertension.

3.  Hyperlipidemia.

4.  Acute kidney injury due to vasomotor nephropathy.



BRIEF HOSPITAL COURSE:  A 46-year-old male patient admitted to the hospital for

epigastric abdominal pain and given his family risk factors, the patient was

evaluated by Cardiology and he had a nuclear stress test, which was negative for

ischemia and patient was given IV Protonix and symptoms subsided.  Also, he was

evaluated by Gastroenterology, had an EGD, which essentially normal except for

mild esophagitis.  During the hospitalization, the patient had an ultrasound of

the abdomen which showed cholelithiasis, evaluated by Gastroenterology and

Surgery.  The patient may need outpatient cholecystectomy.  The patient

continued to have recurrent symptoms.  The patient and his wife verbalized the

understanding of his condition and would like to follow up with Dr. Armando as

scheduled.  The patient has hyperlipidemia; however, he could not tolerate

statins in the past, he developed muscle pain.  So, I recommended him to do some

lifestyle changes and follow up with primary care doctor.



DISCHARGE PHYSICAL EXAMINATION:

GENERAL:  Alert, oriented x 3.

HEART:  S1, S2 present.

LUNGS:  Anterior chest clear.

ABDOMEN:  Soft, nontender, no organomegaly.

EXTREMITIES:  No edema.



DISCHARGE DISPOSITION:  Home.



DISCHARGE CONDITION:  Stable.



DISCHARGE FOLLOWUP:  With primary care doctor and Dr. Armando as scheduled.



DISCHARGE MEDICATIONS:  New scripts provided.



Total time spent for discharge is 32 minutes for patient education, counseling,

and coordination of care.

 



______________________________

CARLY SERRA MD



DR:  ERINN/mya  JOB#:  [id number] / [id number]

DD:  06/15/2017 11:34  DT:  06/15/2017 21:35

## 2017-07-05 ENCOUNTER — HOSPITAL ENCOUNTER (OUTPATIENT)
Dept: HOSPITAL 61 - SURG | Age: 47
Discharge: HOME | End: 2017-07-05
Attending: SURGERY
Payer: COMMERCIAL

## 2017-07-05 VITALS — HEIGHT: 69 IN | BODY MASS INDEX: 40.58 KG/M2 | WEIGHT: 274 LBS

## 2017-07-05 VITALS — SYSTOLIC BLOOD PRESSURE: 115 MMHG | DIASTOLIC BLOOD PRESSURE: 75 MMHG

## 2017-07-05 DIAGNOSIS — Z86.69: ICD-10-CM

## 2017-07-05 DIAGNOSIS — Z87.39: ICD-10-CM

## 2017-07-05 DIAGNOSIS — E78.00: ICD-10-CM

## 2017-07-05 DIAGNOSIS — K80.20: Primary | ICD-10-CM

## 2017-07-05 PROCEDURE — 74300 X-RAY BILE DUCTS/PANCREAS: CPT

## 2017-07-05 PROCEDURE — 47563 LAPARO CHOLECYSTECTOMY/GRAPH: CPT

## 2017-07-05 PROCEDURE — C1769 GUIDE WIRE: HCPCS

## 2017-07-05 NOTE — PDOC4
Operative Note


Operative Note


Operative Note:





Preoperative Diagnosis: Symptomatic cholelithiasis





Postoperative Diagnosis: Same





Procedure: Laparoscopic cholecystectomy with intraoperative cholangiogram





Surgeons: Dale





Anesthesia: GenDino





Estimated Blood Loss: 50 mL





Specimen: Gallbladder to pathology





Drains: None





Complications: None





Indications: The patient is a 46-year-old male who is been experiencing 

recurrent upper abdominal pain consistent with biliary colic.  His evaluation 

included a sonogram which showed gallstones. Surgical treatment was offered by 

means of a laparoscopic cholecystectomy.  The risks of surgery were discussed 

which include bleeding, infection, bile duct injury, bile leak, pain, the 

potential for additional surgeries or procedures.  The patient understands and 

would like to proceed.





Description:  The patient was taken to the operating room and laid supine on 

the operating table.  General anesthesia was performed.  The abdomen was 

prepped with ChloraPrep and draped in a standard surgical fashion.  A small 

supraumbilical incision was made with a scalpel.  The Veress needle was then 

inserted and a pneumoperitoneum was then created.  A  5 mm trocar was then 

inserted and the laparoscope was introduced.  In the upper midabdomen a 5 mm 

trocar was inserted and in the right upper quadrant two 2.3 mm mini lap 

graspers were inserted.  The gallbladder was retracted cephalad.  The patient 

had abundant omentum with fatty infiltration of the gallbladder and liver. To 

assist with visualization and another 2.3 mm mini lap grasper was inserted in 

the right abdomen. This provided downward retraction on the omentum. The cystic 

duct was dissected free from surrounding tissues.  During this we encountered a 

small blood vessel in some fatty tissue anterior to the cystic duct which bled 

a small amount but was controlled readily with a clip. The cystic duct was then 

completely mobilized. One clip was placed on the duct near the gallbladder 

junction.  An opening was made in the duct and a cholangiocatheter placed 

within and secured with a clip.  Using contrast dye and fluoroscopy an 

intraoperative cholangiogram was performed that appeared unremarkable.  The 

clip and catheter were then withdrawn.  Three clips were placed on the cystic 

duct and it was divided.  The cystic artery was then identified, dissected free

, doubly clipped and divided as well.  The gallbladder was then mobilized away 

from the liver with cautery.  The umbilical 5 millimeter trocar was exchanged 

for an 11 millimeter trocar.  The gallbladder was then placed in an endoscopic 

bag and extracted at the umbilical trocar site.  The fascia there was closed 

with an 0 Vicryl suture.  All blood and irrigation fluid was suctioned and 

hemostasis was good.  The remaining ports were removed and the pneumoperitoneum 

was relieved.  The skin incisions were injected with half percent Marcaine with 

epinephrine, and all were closed using 4-0 Monocryl suture.  Steri-Strips and 

dressings were then applied.  The patient tolerated the procedure well and was 

sent to the recovery room in stable condition.  At the end of the case all 

counts were correct.











ANGELO HAMILTON MD Jul 5, 2017 11:20

## 2017-07-05 NOTE — RAD
Intraoperative cholangiogram, 7/5/2017:



History: Cholecystectomy



3 spot films from surgery are presented for review. Contrast has been injected

into the cystic duct remnant. 0.3 minutes of fluoroscopy time was utilized.

There is good flow of contrast into the duodenum at the ampulla. No filling

defect is seen in the common duct to suggest a retained calculus. The

incompletely opacified intrahepatic ducts are unremarkable.



IMPRESSION: No significant abnormality is detected.

## 2017-07-07 NOTE — PATHOLOGY
PATHOLOGY REPORT 

 

*    *    *    *    *    *    *    * 

 FINAL DIAGNOSIS:

Gallbladder, cholecystectomy:

- Cholelithiasis. 

- Cholesterolosis. 

- Chronic cholecystitis with eosinophils.  

 

COMMENT:

Sections of the gallbladder show chronic inflammation with increased

numbers of eosinophils.  There is no evidence of malignancy. 

(JPM:pit; 2017) 

 

 

REPORT ELECTRONICALLY SIGNED BY:   Clement Gómez M.D.

DATE/TIME:   2017 13:45

*    *    *    *    *    *    *    *

 

GROSS PATHOLOGY:

Received in formalin labeled "Buster Dumont, gallbladder and

contents," is a 7.7 x 3.2 x 3.1 cm, intact gallbladder with a

moderate amount of smooth yellow adipose tissue covering the serosal

surfaces.  Opening the gallbladder reveals yellow-green velvety

mucosa and an average wall thickness of 0.2 cm. One calculus is

present, green to brown and measures 1.5 x 1.2 x 1.1 cm in maximum

dimensions.  No masses are noted grossly.  Representative sections

from the body and fundus are submitted along with the proximal margin

in cassette A1.

(GURMEET; 2017)

 

 

 INITIAL CPT CODE(S):

A; 81736

Professional services performed by [employer] [place] 

[place] [address]

 

  Technical services performed by [employer] at [address] [place] [address], [facility]

[address], [place] [address].

  

 SPECIMEN(S) RECEIVED:

A.Gallbladder and contents

 

 CLINICAL HISTORY:

None provided

 

 PATIENT:  BUSTER DUMONT

/AGE:  1970 (Age: 46)  

PATIENT #:  [id number]

ACCESSION #:  [id number]          ALT CASE #:   

SPECIMEN COLLECTION DATE:  2017

SPECIMEN RECEIVED DATE:  2017

   

[facility] - [id number] [place] [address] - PHONE: 

[phone]

* * *  END OF REPORT  * * *

## 2019-01-17 ENCOUNTER — HOSPITAL ENCOUNTER (EMERGENCY)
Dept: HOSPITAL 61 - ER | Age: 49
Discharge: HOME | End: 2019-01-17
Payer: COMMERCIAL

## 2019-01-17 VITALS — SYSTOLIC BLOOD PRESSURE: 142 MMHG | DIASTOLIC BLOOD PRESSURE: 95 MMHG

## 2019-01-17 VITALS — WEIGHT: 265 LBS | BODY MASS INDEX: 39.25 KG/M2 | HEIGHT: 69 IN

## 2019-01-17 DIAGNOSIS — Z90.49: ICD-10-CM

## 2019-01-17 DIAGNOSIS — R10.31: Primary | ICD-10-CM

## 2019-01-17 DIAGNOSIS — I10: ICD-10-CM

## 2019-01-17 LAB
ALBUMIN SERPL-MCNC: 3.4 G/DL (ref 3.4–5)
ALBUMIN/GLOB SERPL: 0.9 {RATIO} (ref 1–1.7)
ALP SERPL-CCNC: 77 U/L (ref 46–116)
ALT SERPL-CCNC: 35 U/L (ref 16–63)
AMPHETAMINE/METHAMPHETAMINE: (no result)
ANION GAP SERPL CALC-SCNC: 5 MMOL/L (ref 6–14)
APTT PPP: YELLOW S
AST SERPL-CCNC: 22 U/L (ref 15–37)
BACTERIA #/AREA URNS HPF: 0 /HPF
BARBITURATES UR-MCNC: (no result) UG/ML
BASOPHILS # BLD AUTO: 0 X10^3/UL (ref 0–0.2)
BASOPHILS NFR BLD: 1 % (ref 0–3)
BENZODIAZ UR-MCNC: (no result) UG/L
BILIRUB SERPL-MCNC: 0.9 MG/DL (ref 0.2–1)
BILIRUB UR QL STRIP: NEGATIVE
BUN SERPL-MCNC: 16 MG/DL (ref 8–26)
BUN/CREAT SERPL: 13 (ref 6–20)
CALCIUM SERPL-MCNC: 8.4 MG/DL (ref 8.5–10.1)
CANNABINOIDS UR-MCNC: (no result) UG/L
CHLORIDE SERPL-SCNC: 102 MMOL/L (ref 98–107)
CK SERPL-CCNC: 75 U/L (ref 39–308)
CO2 SERPL-SCNC: 26 MMOL/L (ref 21–32)
COCAINE UR-MCNC: (no result) NG/ML
CREAT SERPL-MCNC: 1.2 MG/DL (ref 0.7–1.3)
EOSINOPHIL NFR BLD: 0.1 X10^3/UL (ref 0–0.7)
EOSINOPHIL NFR BLD: 1 % (ref 0–3)
ERYTHROCYTE [DISTWIDTH] IN BLOOD BY AUTOMATED COUNT: 12.7 % (ref 11.5–14.5)
FIBRINOGEN PPP-MCNC: CLEAR MG/DL
GFR SERPLBLD BASED ON 1.73 SQ M-ARVRAT: 64.6 ML/MIN
GLOBULIN SER-MCNC: 3.8 G/DL (ref 2.2–3.8)
GLUCOSE SERPL-MCNC: 93 MG/DL (ref 70–99)
HCT VFR BLD CALC: 43.5 % (ref 39–53)
HGB BLD-MCNC: 15 G/DL (ref 13–17.5)
LIPASE: 75 U/L (ref 73–393)
LYMPHOCYTES # BLD: 1.9 X10^3/UL (ref 1–4.8)
LYMPHOCYTES NFR BLD AUTO: 23 % (ref 24–48)
MCH RBC QN AUTO: 31 PG (ref 25–35)
MCHC RBC AUTO-ENTMCNC: 35 G/DL (ref 31–37)
MCV RBC AUTO: 89 FL (ref 79–100)
METHADONE SERPL-MCNC: (no result) NG/ML
MONO #: 0.7 X10^3/UL (ref 0–1.1)
MONOCYTES NFR BLD: 8 % (ref 0–9)
NEUT #: 5.6 X10^3UL (ref 1.8–7.7)
NEUTROPHILS NFR BLD AUTO: 67 % (ref 31–73)
NITRITE UR QL STRIP: NEGATIVE
OPIATES UR-MCNC: (no result) NG/ML
PCP SERPL-MCNC: (no result) MG/DL
PH UR STRIP: 5.5 [PH]
PLATELET # BLD AUTO: 237 X10^3/UL (ref 140–400)
POTASSIUM SERPL-SCNC: 4.4 MMOL/L (ref 3.5–5.1)
PROT SERPL-MCNC: 7.2 G/DL (ref 6.4–8.2)
PROT UR STRIP-MCNC: NEGATIVE MG/DL
RBC # BLD AUTO: 4.89 X10^6/UL (ref 4.3–5.7)
RBC #/AREA URNS HPF: (no result) /HPF (ref 0–2)
SODIUM SERPL-SCNC: 133 MMOL/L (ref 136–145)
SQUAMOUS #/AREA URNS LPF: (no result) /LPF
UROBILINOGEN UR-MCNC: 0.2 MG/DL
WBC # BLD AUTO: 8.3 X10^3/UL (ref 4–11)
WBC #/AREA URNS HPF: (no result) /HPF (ref 0–4)

## 2019-01-17 PROCEDURE — 81001 URINALYSIS AUTO W/SCOPE: CPT

## 2019-01-17 PROCEDURE — 85025 COMPLETE CBC W/AUTO DIFF WBC: CPT

## 2019-01-17 PROCEDURE — 80053 COMPREHEN METABOLIC PANEL: CPT

## 2019-01-17 PROCEDURE — 99284 EMERGENCY DEPT VISIT MOD MDM: CPT

## 2019-01-17 PROCEDURE — 83690 ASSAY OF LIPASE: CPT

## 2019-01-17 PROCEDURE — 36415 COLL VENOUS BLD VENIPUNCTURE: CPT

## 2019-01-17 PROCEDURE — 96374 THER/PROPH/DIAG INJ IV PUSH: CPT

## 2019-01-17 PROCEDURE — 82550 ASSAY OF CK (CPK): CPT

## 2019-01-17 PROCEDURE — 96375 TX/PRO/DX INJ NEW DRUG ADDON: CPT

## 2019-01-17 PROCEDURE — 74177 CT ABD & PELVIS W/CONTRAST: CPT

## 2019-01-17 PROCEDURE — 96361 HYDRATE IV INFUSION ADD-ON: CPT

## 2019-01-17 PROCEDURE — 80307 DRUG TEST PRSMV CHEM ANLYZR: CPT

## 2019-01-17 RX ADMIN — MORPHINE SULFATE ONE MG: 10 INJECTION INTRAMUSCULAR; INTRAVENOUS; SUBCUTANEOUS at 15:46

## 2019-01-17 RX ADMIN — ONDANSETRON ONE MG: 2 INJECTION INTRAMUSCULAR; INTRAVENOUS at 15:47

## 2019-01-17 RX ADMIN — BACITRACIN ONE MLS/HR: 5000 INJECTION, POWDER, FOR SOLUTION INTRAMUSCULAR at 15:47

## 2019-01-17 RX ADMIN — IOHEXOL ONE ML: 300 INJECTION, SOLUTION INTRAVENOUS at 16:41

## 2019-01-17 NOTE — PHYS DOC
Past Medical History


Past Medical History:  Hypertension


Past Surgical History:  Cholecystectomy


Additional Past Surgical Histo:  "tumor removed from neck"


Alcohol Use:  None


Drug Use:  None





Adult General


Chief Complaint


Chief Complaint:  ABDOMINAL PAIN





HPI


HPI





Patient is a 48  year old male with history of hypertension, cholecystomy who 

presents today who presents today complaining of mild to moderate sharp mid to 

right lower quadrant abdominal pain that has been going on intermittently for 2 

weeks. Patient states pain is worse on certain days than others. Patient denies 

anything exacerbating or making the pain better. Denies any nausea vomiting. 

Denies any diarrhea. Patient states he has not taken anything for his pain.





Review of Systems


Review of Systems





Constitutional: Denies fever or chills []


Eyes: Denies change in visual acuity, redness, or eye pain []


HENT: Denies nasal congestion or sore throat []


Respiratory: Denies cough or shortness of breath []


Cardiovascular: No additional information not addressed in HPI []


GI: Reports right-sided mid and lower quadrant abdominal pain, denies nausea, 

vomiting, bloody stools or diarrhea []


: Denies dysuria or hematuria []


Musculoskeletal: Denies back pain or joint pain []


Integument: Denies rash or skin lesions []


Neurologic: Denies headache, focal weakness or sensory changes []








All other systems were reviewed and found to be within normal limits, except as 

documented in this note.





Current Medications


Current Medications





Current Medications








 Medications


  (Trade)  Dose


 Ordered  Sig/Jorge  Start Time


 Stop Time Status Last Admin


Dose Admin


 


 Info


  (CONTRAST GIVEN


 -- Rx MONITORING)  1 each  PRN DAILY  PRN  1/17/19 15:45


 1/17/19 17:39 DC  





 


 Iohexol


  (Omnipaque 300


 Mg/ml)  75 ml  1X  ONCE  1/17/19 15:45


 1/17/19 15:46 DC 1/17/19 16:41


75 ML


 


 Morphine Sulfate


  (Morphine


 Sulfate)  5 mg  1X  ONCE  1/17/19 15:15


 1/17/19 15:16 DC 1/17/19 15:46


5 MG


 


 Ondansetron HCl


  (Zofran)  4 mg  1X  ONCE  1/17/19 15:15


 1/17/19 15:16 DC 1/17/19 15:47


4 MG


 


 Sodium Chloride  1,000 ml @ 


 1,000 mls/hr  1X  ONCE  1/17/19 15:15


 1/17/19 16:14 DC 1/17/19 15:47


1,000 MLS/HR











Allergies


Allergies





Allergies








Coded Allergies Type Severity Reaction Last Updated Verified


 


  No Known Drug Allergies    7/5/17 No











Physical Exam


Physical Exam





Constitutional: Well developed, well nourished, no acute distress, non-toxic 

appearance. []


HENT: Normocephalic, atraumatic, bilateral external ears normal, oropharynx 

moist, no oral exudates, nose normal. []


Eyes: PERRLA, EOMI, conjunctiva normal, no discharge. [] 


Neck: Normal range of motion, no tenderness, supple, no stridor. [] 


Cardiovascular:Heart rate regular rhythm, no murmur []


Lungs & Thorax:  Bilateral breath sounds clear to auscultation []


Abdomen: Bowel sounds normal, soft, no right upper quadrant tenderness, 

negative Granados sign, mild mid abdomen to right lower quadrant tenderness on 

palpation, negative psoas sign, negative obturator sign, negative Rovsing sign 

no masses, no pulsatile masses. [] 


Skin: Warm, dry, no erythema, no rash. [] 


Back: No tenderness, no CVA tenderness. [] 


Extremities: No tenderness, no cyanosis, no clubbing, ROM intact, no edema. [] 


Neurologic: Alert and oriented X 3, normal motor function, normal sensory 

function, no focal deficits noted. []


Psychologic: Affect normal, judgement normal, mood normal. []





Current Patient Data


Vital Signs





 Vital Signs








  Date Time  Temp Pulse Resp B/P (MAP) Pulse Ox O2 Delivery O2 Flow Rate FiO2


 


1/17/19 17:00  81 20 142/95 (111) 100 Room Air  


 


1/17/19 14:54 98.4       





 98.4       








Lab Values





 Laboratory Tests








Test


 1/17/19


14:39 1/17/19


15:34


 


Urine Collection Type Unknown   


 


Urine Color Yellow   


 


Urine Clarity Clear   


 


Urine pH 5.5   


 


Urine Specific Gravity 1.025   


 


Urine Protein


 Negative mg/dL


(NEG-TRACE) 





 


Urine Glucose (UA)


 Negative mg/dL


(NEG) 





 


Urine Ketones (Stick)


 Negative mg/dL


(NEG) 





 


Urine Blood


 Negative (NEG)


 





 


Urine Nitrite


 Negative (NEG)


 





 


Urine Bilirubin


 Negative (NEG)


 





 


Urine Urobilinogen Dipstick


 0.2 mg/dL (0.2


mg/dL) 





 


Urine Leukocyte Esterase


 Negative (NEG)


 





 


Urine RBC


 Occ /HPF (0-2)


 





 


Urine WBC


 1-4 /HPF (0-4)


 





 


Urine Squamous Epithelial


Cells Few /LPF  


 





 


Urine Bacteria


 0 /HPF (0-FEW)


 





 


Urine Mucus Mod /LPF   


 


Urine Opiates Screen Neg (NEG)   


 


Urine Methadone Screen Neg (NEG)   


 


Urine Barbiturates Neg (NEG)   


 


Urine Phencyclidine Screen Neg (NEG)   


 


Urine


Amphetamine/Methamphetamine Neg (NEG)  


 





 


Urine Benzodiazepines Screen Neg (NEG)   


 


Urine Cocaine Screen Neg (NEG)   


 


Urine Cannabinoids Screen Neg (NEG)   


 


Urine Ethyl Alcohol Neg (NEG)   


 


White Blood Count


 


 8.3 x10^3/uL


(4.0-11.0)


 


Red Blood Count


 


 4.89 x10^6/uL


(4.30-5.70)


 


Hemoglobin


 


 15.0 g/dL


(13.0-17.5)


 


Hematocrit


 


 43.5 %


(39.0-53.0)


 


Mean Corpuscular Volume


 


 89 fL ()





 


Mean Corpuscular Hemoglobin  31 pg (25-35)  


 


Mean Corpuscular Hemoglobin


Concent 


 35 g/dL


(31-37)


 


Red Cell Distribution Width


 


 12.7 %


(11.5-14.5)


 


Platelet Count


 


 237 x10^3/uL


(140-400)


 


Neutrophils (%) (Auto)  67 % (31-73)  


 


Lymphocytes (%) (Auto)  23 % (24-48)  L


 


Monocytes (%) (Auto)  8 % (0-9)  


 


Eosinophils (%) (Auto)  1 % (0-3)  


 


Basophils (%) (Auto)  1 % (0-3)  


 


Neutrophils # (Auto)


 


 5.6 x10^3uL


(1.8-7.7)


 


Lymphocytes # (Auto)


 


 1.9 x10^3/uL


(1.0-4.8)


 


Monocytes # (Auto)


 


 0.7 x10^3/uL


(0.0-1.1)


 


Eosinophils # (Auto)


 


 0.1 x10^3/uL


(0.0-0.7)


 


Basophils # (Auto)


 


 0.0 x10^3/uL


(0.0-0.2)


 


Sodium Level


 


 133 mmol/L


(136-145)  L


 


Potassium Level


 


 4.4 mmol/L


(3.5-5.1)


 


Chloride Level


 


 102 mmol/L


()


 


Carbon Dioxide Level


 


 26 mmol/L


(21-32)


 


Anion Gap  5 (6-14)  L


 


Blood Urea Nitrogen


 


 16 mg/dL


(8-26)


 


Creatinine


 


 1.2 mg/dL


(0.7-1.3)


 


Estimated GFR


(Cockcroft-Gault) 


 64.6  





 


BUN/Creatinine Ratio  13 (6-20)  


 


Glucose Level


 


 93 mg/dL


(70-99)


 


Calcium Level


 


 8.4 mg/dL


(8.5-10.1)  L


 


Total Bilirubin


 


 0.9 mg/dL


(0.2-1.0)


 


Aspartate Amino Transferase


(AST) 


 22 U/L (15-37)





 


Alanine Aminotransferase (ALT)


 


 35 U/L (16-63)





 


Alkaline Phosphatase


 


 77 U/L


()


 


Creatine Kinase


 


 75 U/L


()


 


Total Protein


 


 7.2 g/dL


(6.4-8.2)


 


Albumin


 


 3.4 g/dL


(3.4-5.0)


 


Albumin/Globulin Ratio


 


 0.9 (1.0-1.7)


L


 


Lipase


 


 75 U/L


()


 


Ethyl Alcohol Level


 


 < 10 mg/dL


(0-10)





 Laboratory Tests


1/17/19 15:34








 Laboratory Tests


1/17/19 15:34














EKG


EKG


[]





Radiology/Procedures


Radiology/Procedures


[]PROCEDURE: CT ABD PELV W/ IV CONTRST ONLY





CT ABD PELV W/ IV CONTRST ONLY


 


Indication: RLQ PAIN INJ 75ML OMNI 300 NO PREV 


 


Exposure: One or more of the following individualized dose reduction 


techniques were utilized for this examination:  1. Automated exposure 


control  2. Adjustment of the mA and/or kV according to patient size  3. 


Use of iterative reconstruction technique.


 


Comparison: None are available.


Contrast: Intravenous contrast was given. No oral contrast per request.


 


FINDINGS:


Lower thorax: Lung bases are clear.


 


Liver: Unremarkable


Spleen: Unremarkable


Pancreas: Unremarkable


 


Adrenals: No evidence of mass.


Kidneys: No obvious mass.


Urinary tracts: No hydronephrosis.


 


Gallbladder: Surgically absent


Lymph nodes: No significant enlargement


 


Vessels: Aorta is nonaneurysmal.


 


GI tract: No evidence of acute colitis. No evidence of bowel obstruction.


Appendix is normal.


 


Reproductive organs:No evidence of mass.


Urinary bladder: Unremarkable.


Peritoneum: No evidence of pneumoperitoneum. No free fluid.


Abdominal wall:Unremarkable


 


Spine: Degenerative spondylosis. Vertebral body height intact.


Bones: No destructive process identified.


 


External Soft Tissue: No acute findings.


 


IMPRESSION: No evidence of acute abnormality. The appendix appears normal.


 


Electronically signed by: Rodolfo Last MD (1/17/2019 4:59 PM) [facility]














DICTATED and SIGNED BY:     RODOLFO LAST MD


DATE:     01/17/19 1651





Course & Med Decision Making


Course & Med Decision Making


Pertinent Labs and Imaging studies reviewed. (See chart for details)





This is a 48-year-old male patient presented to the ED today with right mid and 

right lower quadrant abdominal pain for 2 weeks. Patient's labs are negative 

for any acute findings, CT of the abdomen and pelvic is negative. Patient is in 

no distress, resting comfortably in the bed. Spoke to patient about following 

up with the GI doctor which he agreed to. OTC pain relievers. Discharged in 

stable condition.











Staff Physician Addendum:


I was working in the ER during the course of this patient's visit.  I was 

available for consultation as needed, but I was not directly involved in the 

care of this patient.





Dragon Disclaimer


Dragon Disclaimer


This electronic medical record was generated, in whole or in part, using a 

voice recognition dictation system.





Departure


Departure


Impression:  


 Primary Impression:  


 Abdominal pain


Disposition:  01 HOME, SELF-CARE


Condition:  STABLE


Referrals:  


NO PCP (PCP)








ANGELO GONZALEZ MD


follow up in 1 week


Patient Instructions:  Abdominal Pain (Nonspecific)





Additional Instructions:  


You were evaluated in the emergency room for abdominal pain. We could not find 

any acute source for your pain. Please follow-up with the primary care doctor 

as well as the provided GI specialist in 1-2 weeks. Take over-the-counter 

medications as needed for pain.





Problem Qualifiers








 Primary Impression:  


 Abdominal pain


 Abdominal location:  unspecified location  Qualified Codes:  R10.9 - 

Unspecified abdominal pain








HERBERT PIKE Jan 17, 2019 15:16


BHAVYA LUND MD Jan 17, 2019 19:42

## 2019-01-17 NOTE — RAD
CT ABD PELV W/ IV CONTRST ONLY

 

Indication: RLQ PAIN INJ 75ML OMNI 300 NO PREV 

 

Exposure: One or more of the following individualized dose reduction 

techniques were utilized for this examination:  1. Automated exposure 

control  2. Adjustment of the mA and/or kV according to patient size  3. 

Use of iterative reconstruction technique.

 

Comparison: None are available.

Contrast: Intravenous contrast was given. No oral contrast per request.

 

FINDINGS:

Lower thorax: Lung bases are clear.

 

Liver: Unremarkable

Spleen: Unremarkable

Pancreas: Unremarkable

 

Adrenals: No evidence of mass.

Kidneys: No obvious mass.

Urinary tracts: No hydronephrosis.

 

Gallbladder: Surgically absent

Lymph nodes: No significant enlargement

 

Vessels: Aorta is nonaneurysmal.

 

GI tract: No evidence of acute colitis. No evidence of bowel obstruction.

Appendix is normal.

 

Reproductive organs:No evidence of mass.

Urinary bladder: Unremarkable.

Peritoneum: No evidence of pneumoperitoneum. No free fluid.

Abdominal wall:Unremarkable

 

Spine: Degenerative spondylosis. Vertebral body height intact.

Bones: No destructive process identified.

 

External Soft Tissue: No acute findings.

 

IMPRESSION: No evidence of acute abnormality. The appendix appears normal.

 

Electronically signed by: Rodolfo Last MD (1/17/2019 4:59 PM) [facility]

## 2019-04-10 ENCOUNTER — HOSPITAL ENCOUNTER (EMERGENCY)
Dept: HOSPITAL 61 - ER | Age: 49
Discharge: HOME | End: 2019-04-10
Payer: COMMERCIAL

## 2019-04-10 VITALS — SYSTOLIC BLOOD PRESSURE: 155 MMHG | DIASTOLIC BLOOD PRESSURE: 79 MMHG

## 2019-04-10 VITALS — BODY MASS INDEX: 38.65 KG/M2 | WEIGHT: 270 LBS | HEIGHT: 70 IN

## 2019-04-10 DIAGNOSIS — I10: ICD-10-CM
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
## 2019-04-10 DIAGNOSIS — Z90.49: ICD-10-CM
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
## 2019-04-10 DIAGNOSIS — S39.81XA: ICD-10-CM

## 2019-04-10 DIAGNOSIS — S80.11XA: ICD-10-CM

## 2019-04-10 DIAGNOSIS — S29.012A: Primary | ICD-10-CM

## 2019-04-10 DIAGNOSIS — S20.212A: ICD-10-CM

## 2019-04-10 PROCEDURE — 71250 CT THORAX DX C-: CPT

## 2019-04-10 PROCEDURE — 73590 X-RAY EXAM OF LOWER LEG: CPT

## 2019-04-10 PROCEDURE — 90471 IMMUNIZATION ADMIN: CPT

## 2019-04-10 PROCEDURE — 74176 CT ABD & PELVIS W/O CONTRAST: CPT

## 2019-04-10 PROCEDURE — 90715 TDAP VACCINE 7 YRS/> IM: CPT
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
## 2021-12-25 NOTE — PHYS DOC
Past Medical History


Past Medical History:  Hypertension


Past Surgical History:  Cholecystectomy


Additional Past Surgical Histo:  "tumor removed from neck"


Smoking Status:  Never Smoker


Alcohol Use:  None


Drug Use:  None





General Adult


EDM:


Chief Complaint:  NAUSEA/VOMITING/DIARRHEA





HPI:


HPI:





Patient is a 51  year old male that was initially seen by Dr. Butler.  Please see 

her note for further details of HPI.  I assumed care of 1800.  He had been given

fluids and antiemetics.  Laboratory exams were unremarkable, CT abdomen pelvis 

was pending at time of transfer of care.  CT abdomen pelvis returned as 

unremarkable, no acute process.  Please see her note for further details of re

view of systems and physical exam findings.





Review of Systems:


Review of Systems:


See previous ED physician note for review of systems





Heart Score:


C/O Chest Pain:  N/A


Risk Factors:


Risk Factors:  DM, Current or recent (<one month) smoker, HTN, HLP, family 

history of CAD, obesity.


Risk Scores:


Score 0 - 3:  2.5% MACE over next 6 weeks - Discharge Home


Score 4 - 6:  20.3% MACE over next 6 weeks - Admit for Clinical Observation


Score 7 - 10:  72.7% MACE over next 6 weeks - Early Invasive Strategies





Current Medications:





Current Medications








 Medications


  (Trade)  Dose


 Ordered  Sig/Jorge  Start Time


 Stop Time Status Last Admin


Dose Admin


 


 Famotidine


  (Pepcid Vial)  20 mg  1X  ONCE  21 16:00


 21 16:01 DC 21 15:49


20 MG


 


 Info


  (CONTRAST GIVEN


 -- Rx MONITORING)  1 each  PRN DAILY  PRN  21 18:15


 21 18:14   





 


 Iohexol


  (Omnipaque 300


 Mg/ml)  75 ml  1X  ONCE  21 18:00


 21 18:07 DC 21 18:00


75 ML


 


 Ketorolac


 Tromethamine


  (Toradol 15mg


 Vial)  15 mg  1X  ONCE  21 17:15


 21 17:16 DC 21 17:51


15 MG


 


 Metoclopramide HCl


  (Reglan Vial)  10 mg  1X  ONCE  21 17:45


 21 17:53 DC 21 17:51


10 MG


 


 Ondansetron HCl


  (Zofran)  4 mg  STK-MED ONCE  21 15:45


 12/25/21 15:45 DC  





 


 Sodium Chloride  1,000 ml @ 


 1,000 mls/hr  1X  ONCE  21 15:45


 21 16:44 DC 21 15:46


1,000 MLS/HR











Allergies:


Allergies:





Allergies








Coded Allergies Type Severity Reaction Last Updated Verified


 


  No Known Drug Allergies    17 No











Physical Exam:


PE:





Constitutional: Well developed, well nourished, no acute distress, non-toxic 

appearance. []


HENT: Normocephalic, atraumatic


Cardiovascular: Well-perfused appearing


Lungs & Thorax: Respirations are nonlabored


Neurologic: He is awake, alert, conversant, ambulatory


Psychologic: Affect normal, judgement normal, mood normal. []





Current Patient Data:


Labs:





                                Laboratory Tests








Test


 21


15:40 21


20:19


 


White Blood Count


 13.1 x10^3/uL


(4.0-11.0)  H 





 


Red Blood Count


 5.21 x10^6/uL


(4.30-5.70) 





 


Hemoglobin


 15.8 g/dL


(13.0-17.5) 





 


Hematocrit


 46.9 %


(39.0-53.0) 





 


Mean Corpuscular Volume


 90 fL ()


 





 


Mean Corpuscular Hemoglobin 30 pg (25-35)   


 


Mean Corpuscular Hemoglobin


Concent 34 g/dL


(31-37) 





 


Red Cell Distribution Width


 12.7 %


(11.5-14.5) 





 


Platelet Count


 245 x10^3/uL


(140-400) 





 


Neutrophils (%) (Auto) 89 % (31-73)  H 


 


Lymphocytes (%) (Auto) 7 % (24-48)  L 


 


Monocytes (%) (Auto) 4 % (0-9)   


 


Eosinophils (%) (Auto) 0 % (0-3)   


 


Basophils (%) (Auto) 1 % (0-3)   


 


Neutrophils # (Auto)


 11.7 x10^3/uL


(1.8-7.7)  H 





 


Lymphocytes # (Auto)


 0.9 x10^3/uL


(1.0-4.8)  L 





 


Monocytes # (Auto)


 0.5 x10^3/uL


(0.0-1.1) 





 


Eosinophils # (Auto)


 0.0 x10^3/uL


(0.0-0.7) 





 


Basophils # (Auto)


 0.1 x10^3/uL


(0.0-0.2) 





 


Sodium Level


 141 mmol/L


(136-145) 





 


Potassium Level


 3.9 mmol/L


(3.5-5.1) 





 


Chloride Level


 105 mmol/L


() 





 


Carbon Dioxide Level


 22 mmol/L


(21-32) 





 


Anion Gap 14 (6-14)   


 


Blood Urea Nitrogen


 20 mg/dL


(8-26) 





 


Creatinine


 1.2 mg/dL


(0.7-1.3) 





 


Estimated GFR


(Cockcroft-Gault) 63.8  


 





 


BUN/Creatinine Ratio 17 (6-20)   


 


Glucose Level


 160 mg/dL


(70-99)  H 





 


Calcium Level


 8.5 mg/dL


(8.5-10.1) 





 


Total Bilirubin


 1.8 mg/dL


(0.2-1.0)  H 





 


Aspartate Amino Transferase


(AST) 19 U/L (15-37)


 





 


Alanine Aminotransferase (ALT)


 47 U/L (16-63)


 





 


Alkaline Phosphatase


 92 U/L


() 





 


Total Protein


 7.6 g/dL


(6.4-8.2) 





 


Albumin


 3.6 g/dL


(3.4-5.0) 





 


Albumin/Globulin Ratio


 0.9 (1.0-1.7)


L 





 


Lipase


 38 U/L


()  L 





 


Urine Collection Type  Unknown  


 


Urine Color  Roopa  


 


Urine Clarity  Cloudy  


 


Urine pH


 


 5.5 (<5.0-8.0)





 


Urine Specific Gravity


 


 >=1.030


(1.000-1.030)


 


Urine Protein


 


 Negative mg/dL


(NEG-TRACE)


 


Urine Glucose (UA)


 


 Negative mg/dL


(NEG)


 


Urine Ketones (Stick)


 


 15 mg/dL (NEG)





 


Urine Blood


 


 Negative (NEG)





 


Urine Nitrite


 


 Negative (NEG)





 


Urine Bilirubin  Small (NEG)  


 


Urine Urobilinogen Dipstick


 


 0.2 mg/dL (0.2


mg/dL)


 


Urine Leukocyte Esterase


 


 Negative (NEG)





 


Urine RBC


 


 Rare /HPF


(0-2)


 


Urine WBC


 


 Occ /HPF (0-4)





 


Urine Squamous Epithelial


Cells 


 Occ /LPF  





 


Urine Bacteria


 


 0 /HPF (0-FEW)





 


Urine Mucus  Mod /LPF  





                                Laboratory Tests


21 15:40








                                Laboratory Tests


21 15:40








Vital Signs:





                                   Vital Signs








  Date Time  Temp Pulse Resp B/P (MAP) Pulse Ox O2 Delivery O2 Flow Rate FiO2


 


12/25/21 17:17  84  142/87 (105) 100 Room Air  


 


21 15:18 97.9  12     





 97.9       











EKG:


EKG:


[]





Radiology/Procedures:


Radiology/Procedures:


                                 IMAGING REPORT





                                     Signed





PATIENT: PAU DUMONT  ACCOUNT: [id number]     MRN#: [id number]


: 1970           LOCATION: ER              AGE: 51


SEX: M                    EXAM DT: 21         ACCESSION#: [id number]


STATUS: REG ER            ORD. PHYSICIAN: WINSOME BUTLER DO


REASON: abd pain


PROCEDURE: CT ABD PELV W/ IV CONTRST ONLY








CT OF THE ABDOMEN AND PELVIS WITH IV CONTRAST.





History:  Reason: abd pain 





Comparison:None.





Procedure: 


Contiguous axial images of the abdomen and pelvis were performed  after the 

administration of 75 cc of Omnipaque 300 IV contrast.





Oral contrast:  No.





Findings:





This prior cholecystectomy





The appendix is normal.





Liver: Unremarkable


Spleen: Unremarkable


Pancreas: Unremarkable


Adrenal Glands: Unremarkable


Kidneys: Small cyst of the left





There is no mass or lymphadenopathy.





There is no free air.  





There is no free fluid.





The urinary bladder appears normal.





Impression:





No acute findings.





End Impression





PQRS Compliance Statement:





One or more of the following individualized dose reduction techniques were 

utilized for this examination:  


1. Automated exposure control  


2. Adjustment of the mA and/or kV according to patient size  


3. Use of iterative reconstruction technique 





Electronically signed by: Sebastian Teran III, MD (2021 8:20 PM) [facility]














DICTATED and SIGNED BY:     SEBASTIAN TERAN III, MD


DATE:     21MTH0 0





Course & Med Decision Making:


Course & Med Decision Making


Pertinent Labs and Imaging studies reviewed. (See chart for details)





I have discussed all the findings, differential diagnosis and plan of care with 

the patient.  He is tolerating p.o. fluids without difficulty.  There is no 

indication for admission or further invasive exams at this time based on current

 clinical presentation.  I discussed home care instructions, including eating a 

bland diet, drinking plenty of clear fluids.  He will be given antiemetics for 

discharge.  I sent a prescription for Zofran to his preferred pharmacy.  Return 

precautions are given.





Dragon Disclaimer:


Dragon Disclaimer:


This electronic medical record was generated, in whole or in part, using a voice

 recognition dictation system.





Departure


Departure


Impression:  


   Primary Impression:  


   Abdominal pain


   Additional Impression:  


   Nausea vomiting and diarrhea


Disposition:   HOME / SELF CARE / HOMELESS


Condition:  STABLE


Referrals:  


NO PCP (PCP)


Patient Instructions:  Abdominal Pain (Nonspecific), Nausea and Vomiting, Viral 

Gastroenteritis





Additional Instructions:  


Drink plenty of clear fluids, eat a bland diet, avoid spicy, greasy or heavy 

foods.  Use the nausea medicine as needed/as directed.  Return to the ER for 

more severe or focal pain, temperature 100.4 or higher, vomiting blood or any 

other concerns.  Follow-up with your primary care physician


Scripts


Ondansetron Hcl (ZOFRAN) 4 Mg Tablet


4 MG PO PRN TID PRN for VOMITING, #20 TAB


   nausea/vomiting


   Prov: KLEBER GREENE DO         21











KLEBER GREENE DO             Dec 25, 2021 21:08

## 2021-12-25 NOTE — PHYS DOC
Past Medical History


Past Medical History:  Hypertension


Past Surgical History:  Cholecystectomy


Additional Past Surgical Histo:  "tumor removed from neck"


Smoking Status:  Never Smoker


Alcohol Use:  None


Drug Use:  None





General Adult


EDM:


Chief Complaint:  NAUSEA/VOMITING/DIARRHEA





HPI:


HPI:


50 yo M PMH HTN presents to the ED with his wife, (patient consents to 

his/her/their knowledge and involvement in pts' medical care), complaints of 

nausea, vomiting and loose watery diarrhea that started around 6 PM last night 

after patient had one more chicken nuggets. Associated diffuse, nonradiating, 

cramping abdominal pain and myaglias "everything hurts from vomiting." States 

other family members ate the same food and are not symptomatic. Denies any 

associated binge alcohol drinking or drug use. History of cholecystectomy. Has 

received his Covid and influenza vaccinations. No known sick contacts.





Review of Systems:


Review of Systems:


Constitutional:   Denies fever or chills. []


Eyes:   Denies change in visual acuity. []


HENT:   Denies nasal congestion or sore throat. [] 


Respiratory:   Denies cough or shortness of breath. [] 


Cardiovascular:   Denies chest pain or edema. [] 


GI:   Denies constipation or melena


:  Denies dysuria or hematuria


Musculoskeletal:   Denies back pain or joint pain. [] 


Integument:   Denies rash or diaphoresis


Neurologic:   Denies headache, focal weakness or sensory changes. [] 


Endocrine:   Denies polyuria or polydipsia. [] 


Lymphatic:  Denies swollen glands. [] 


Psychiatric:  Denies depression or anxiety. []





Heart Score:


C/O Chest Pain:  No


Risk Factors:


Risk Factors:  DM, Current or recent (<one month) smoker, HTN, HLP, family 

history of CAD, obesity.


Risk Scores:


Score 0 - 3:  2.5% MACE over next 6 weeks - Discharge Home


Score 4 - 6:  20.3% MACE over next 6 weeks - Admit for Clinical Observation


Score 7 - 10:  72.7% MACE over next 6 weeks - Early Invasive Strategies





Current Medications:





Current Medications








 Medications


  (Trade)  Dose


 Ordered  Sig/Jorge  Start Time


 Stop Time Status Last Admin


Dose Admin


 


 Famotidine


  (Pepcid Vial)  20 mg  1X  ONCE  12/25/21 16:00


 12/25/21 16:01 DC 12/25/21 15:49


20 MG


 


 Ketorolac


 Tromethamine


  (Toradol 15mg


 Vial)  15 mg  1X  ONCE  12/25/21 17:15


 12/25/21 17:16 DC  





 


 Ondansetron HCl


  (Zofran)  4 mg  STK-MED ONCE  12/25/21 15:45


 12/25/21 15:45 DC  





 


 Sodium Chloride  1,000 ml @ 


 1,000 mls/hr  1X  ONCE  12/25/21 15:45


 12/25/21 16:44 DC 12/25/21 15:46


1,000 MLS/HR











Allergies:


Allergies:





Allergies








Coded Allergies Type Severity Reaction Last Updated Verified


 


  No Known Drug Allergies    7/5/17 No











Physical Exam:


PE:





Constitutional: Flushed appearance, nontoxic-appearing,


HENT: Normocephalic, atraumatic, dry mucous membranes


Eyes: EOMI, conjunctiva normal, no discharge.  


Neck: Normal range of motion,  supple, 


Cardiovascular: S1/2 present, mild tachycardia-100 bpm 


Lungs & Thorax: Speaking in full sentences, bilateral equal chest rise, no 

tachypnea or increased work of breathing


Abdomen:  soft, no grimace with palpation-worsened epigastric region, no 

guarding


Skin: Warm, dry, no erythema, no rash. [] 


Back: No tenderness, no CVA tenderness. [] 


Extremities: No tenderness, no cyanosis, 


Neurologic: Alert and oriented X 3, normal motor function, normal sensory 

function, no focal deficits noted. []


Psychologic: Affect normal, judgement normal, mood normal. []





Current Patient Data:


Labs:





                                Laboratory Tests








Test


 12/25/21


15:40


 


White Blood Count


 13.1 x10^3/uL


(4.0-11.0)  H


 


Red Blood Count


 5.21 x10^6/uL


(4.30-5.70)


 


Hemoglobin


 15.8 g/dL


(13.0-17.5)


 


Hematocrit


 46.9 %


(39.0-53.0)


 


Mean Corpuscular Volume


 90 fL ()





 


Mean Corpuscular Hemoglobin 30 pg (25-35)  


 


Mean Corpuscular Hemoglobin


Concent 34 g/dL


(31-37)


 


Red Cell Distribution Width


 12.7 %


(11.5-14.5)


 


Platelet Count


 245 x10^3/uL


(140-400)


 


Neutrophils (%) (Auto) 89 % (31-73)  H


 


Lymphocytes (%) (Auto) 7 % (24-48)  L


 


Monocytes (%) (Auto) 4 % (0-9)  


 


Eosinophils (%) (Auto) 0 % (0-3)  


 


Basophils (%) (Auto) 1 % (0-3)  


 


Neutrophils # (Auto)


 11.7 x10^3/uL


(1.8-7.7)  H


 


Lymphocytes # (Auto)


 0.9 x10^3/uL


(1.0-4.8)  L


 


Monocytes # (Auto)


 0.5 x10^3/uL


(0.0-1.1)


 


Eosinophils # (Auto)


 0.0 x10^3/uL


(0.0-0.7)


 


Basophils # (Auto)


 0.1 x10^3/uL


(0.0-0.2)


 


Sodium Level


 141 mmol/L


(136-145)


 


Potassium Level


 3.9 mmol/L


(3.5-5.1)


 


Chloride Level


 105 mmol/L


()


 


Carbon Dioxide Level


 22 mmol/L


(21-32)


 


Anion Gap 14 (6-14)  


 


Blood Urea Nitrogen


 20 mg/dL


(8-26)


 


Creatinine


 1.2 mg/dL


(0.7-1.3)


 


Estimated GFR


(Cockcroft-Gault) 63.8  





 


BUN/Creatinine Ratio 17 (6-20)  


 


Glucose Level


 160 mg/dL


(70-99)  H


 


Calcium Level


 8.5 mg/dL


(8.5-10.1)


 


Total Bilirubin


 1.8 mg/dL


(0.2-1.0)  H


 


Aspartate Amino Transferase


(AST) 19 U/L (15-37)





 


Alanine Aminotransferase (ALT)


 47 U/L (16-63)





 


Alkaline Phosphatase


 92 U/L


()


 


Total Protein


 7.6 g/dL


(6.4-8.2)


 


Albumin


 3.6 g/dL


(3.4-5.0)


 


Albumin/Globulin Ratio


 0.9 (1.0-1.7)


L





                                Laboratory Tests


12/25/21 15:40








                                Laboratory Tests


12/25/21 15:40








Vital Signs:





                                   Vital Signs








  Date Time  Temp Pulse Resp B/P (MAP) Pulse Ox O2 Delivery O2 Flow Rate FiO2


 


12/25/21 17:17  84  142/87 (105) 100 Room Air  


 


12/25/21 15:18 97.9  12     





 97.9       











EKG:


EKG:


[]





Radiology/Procedures:


Radiology/Procedures:


[]





Course & Med Decision Making:


Course & Med Decision Making


Pertinent Labs and Imaging studies reviewed. (See chart for details)





Concern for diffuse abdominal pain in the setting of nausea, vomiting and 

diarrhea-no associated bloody emesis or bloody stool. Labs with nonspecific, 

leukocytosis of 13. On reevaluation patient requiring more antiemetics and 

reports his abdominal pain is returning. Due to shift change, patient was signed

 out to oncoming physician Dr. Sharif. Patient is pending CT abdomen pelvis 

with IV contrast reevaluation.





Dragon Disclaimer:


Dragon Disclaimer:


This electronic medical record was generated, in whole or in part, using a voice

 recognition dictation system.





Departure


Departure


Impression:  


   Primary Impression:  


   Abdominal pain


   Additional Impression:  


   Nausea vomiting and diarrhea


Referrals:  


NO PCP (PCP)











WINSOME BUTLER DO               Dec 25, 2021 17:48

## 2021-12-25 NOTE — RAD
CT OF THE ABDOMEN AND PELVIS WITH IV CONTRAST.



History:  Reason: abd pain 



Comparison:None.



Procedure: 

Contiguous axial images of the abdomen and pelvis were performed  after the administration of 75 cc o
f Omnipaque 300 IV contrast.



Oral contrast:  No.



Findings:



This prior cholecystectomy



The appendix is normal.



Liver: Unremarkable

Spleen: Unremarkable

Pancreas: Unremarkable

Adrenal Glands: Unremarkable

Kidneys: Small cyst of the left



There is no mass or lymphadenopathy.



There is no free air.  



There is no free fluid.



The urinary bladder appears normal.



Impression:



No acute findings.



End Impression



PQRS Compliance Statement:



One or more of the following individualized dose reduction techniques were utilized for this examinat
ion:  

1. Automated exposure control  

2. Adjustment of the mA and/or kV according to patient size  

3. Use of iterative reconstruction technique 



Electronically signed by: Clint Marrufo III, MD (12/25/2021 8:20 PM) [facility]
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
